# Patient Record
Sex: FEMALE | Race: WHITE | NOT HISPANIC OR LATINO | Employment: UNEMPLOYED | URBAN - METROPOLITAN AREA
[De-identification: names, ages, dates, MRNs, and addresses within clinical notes are randomized per-mention and may not be internally consistent; named-entity substitution may affect disease eponyms.]

---

## 2017-01-05 ENCOUNTER — GENERIC CONVERSION - ENCOUNTER (OUTPATIENT)
Dept: OTHER | Facility: OTHER | Age: 52
End: 2017-01-05

## 2017-01-10 ENCOUNTER — GENERIC CONVERSION - ENCOUNTER (OUTPATIENT)
Dept: OTHER | Facility: OTHER | Age: 52
End: 2017-01-10

## 2017-01-26 ENCOUNTER — GENERIC CONVERSION - ENCOUNTER (OUTPATIENT)
Dept: OTHER | Facility: OTHER | Age: 52
End: 2017-01-26

## 2017-02-23 ENCOUNTER — GENERIC CONVERSION - ENCOUNTER (OUTPATIENT)
Dept: OTHER | Facility: OTHER | Age: 52
End: 2017-02-23

## 2017-04-07 ENCOUNTER — GENERIC CONVERSION - ENCOUNTER (OUTPATIENT)
Dept: OTHER | Facility: OTHER | Age: 52
End: 2017-04-07

## 2017-05-24 ENCOUNTER — ALLSCRIPTS OFFICE VISIT (OUTPATIENT)
Dept: OTHER | Facility: OTHER | Age: 52
End: 2017-05-24

## 2017-06-12 ENCOUNTER — HOSPITAL ENCOUNTER (EMERGENCY)
Facility: HOSPITAL | Age: 52
Discharge: HOME | End: 2017-06-13
Attending: EMERGENCY MEDICINE
Payer: COMMERCIAL

## 2017-06-12 ENCOUNTER — APPOINTMENT (EMERGENCY)
Dept: RADIOLOGY | Facility: HOSPITAL | Age: 52
End: 2017-06-12
Payer: COMMERCIAL

## 2017-06-12 DIAGNOSIS — J20.9 BRONCHITIS, ACUTE: Primary | ICD-10-CM

## 2017-06-12 LAB
ANION GAP SERPL CALCULATED.3IONS-SCNC: 11 MMOL/L (ref 4–13)
BASOPHILS # BLD AUTO: 0.07 THOUSANDS/ΜL (ref 0–0.1)
BASOPHILS NFR BLD AUTO: 1 % (ref 0–1)
BUN SERPL-MCNC: 12 MG/DL (ref 5–25)
CALCIUM SERPL-MCNC: 9.3 MG/DL (ref 8.3–10.1)
CHLORIDE SERPL-SCNC: 103 MMOL/L (ref 100–108)
CO2 SERPL-SCNC: 26 MMOL/L (ref 21–32)
CREAT SERPL-MCNC: 1.02 MG/DL (ref 0.6–1.3)
DEPRECATED D DIMER PPP: 625 NG/ML (FEU) (ref 0–424)
EOSINOPHIL # BLD AUTO: 0.19 THOUSAND/ΜL (ref 0–0.61)
EOSINOPHIL NFR BLD AUTO: 2 % (ref 0–6)
ERYTHROCYTE [DISTWIDTH] IN BLOOD BY AUTOMATED COUNT: 12.7 % (ref 11.6–15.1)
GFR SERPL CREATININE-BSD FRML MDRD: 57.1 ML/MIN/1.73SQ M
GLUCOSE SERPL-MCNC: 76 MG/DL (ref 65–140)
HCT VFR BLD AUTO: 39.7 % (ref 34.8–46.1)
HGB BLD-MCNC: 12.9 G/DL (ref 11.5–15.4)
LYMPHOCYTES # BLD AUTO: 2.2 THOUSANDS/ΜL (ref 0.6–4.47)
LYMPHOCYTES NFR BLD AUTO: 18 % (ref 14–44)
MCH RBC QN AUTO: 28.6 PG (ref 26.8–34.3)
MCHC RBC AUTO-ENTMCNC: 32.5 G/DL (ref 31.4–37.4)
MCV RBC AUTO: 88 FL (ref 82–98)
MONOCYTES # BLD AUTO: 0.92 THOUSAND/ΜL (ref 0.17–1.22)
MONOCYTES NFR BLD AUTO: 8 % (ref 4–12)
NEUTROPHILS # BLD AUTO: 8.62 THOUSANDS/ΜL (ref 1.85–7.62)
NEUTS SEG NFR BLD AUTO: 71 % (ref 43–75)
PLATELET # BLD AUTO: 314 THOUSANDS/UL (ref 149–390)
PMV BLD AUTO: 9.1 FL (ref 8.9–12.7)
POTASSIUM SERPL-SCNC: 3.7 MMOL/L (ref 3.5–5.3)
RBC # BLD AUTO: 4.51 MILLION/UL (ref 3.81–5.12)
SODIUM SERPL-SCNC: 140 MMOL/L (ref 136–145)
SPECIMEN SOURCE: NORMAL
TROPONIN I BLD-MCNC: 0 NG/ML (ref 0–0.08)
WBC # BLD AUTO: 12 THOUSAND/UL (ref 4.31–10.16)

## 2017-06-12 PROCEDURE — 36415 COLL VENOUS BLD VENIPUNCTURE: CPT | Performed by: EMERGENCY MEDICINE

## 2017-06-12 PROCEDURE — 71020 HB CHEST X-RAY 2VW FRONTAL&LATL: CPT

## 2017-06-12 PROCEDURE — 93005 ELECTROCARDIOGRAM TRACING: CPT | Performed by: EMERGENCY MEDICINE

## 2017-06-12 PROCEDURE — 84484 ASSAY OF TROPONIN QUANT: CPT

## 2017-06-12 PROCEDURE — 80048 BASIC METABOLIC PNL TOTAL CA: CPT | Performed by: EMERGENCY MEDICINE

## 2017-06-12 PROCEDURE — 85379 FIBRIN DEGRADATION QUANT: CPT | Performed by: EMERGENCY MEDICINE

## 2017-06-12 PROCEDURE — 85025 COMPLETE CBC W/AUTO DIFF WBC: CPT | Performed by: EMERGENCY MEDICINE

## 2017-06-12 RX ADMIN — ALBUTEROL SULFATE 5 MG: 2.5 SOLUTION RESPIRATORY (INHALATION) at 22:37

## 2017-06-12 RX ADMIN — IPRATROPIUM BROMIDE 0.5 MG: 0.5 SOLUTION RESPIRATORY (INHALATION) at 22:38

## 2017-06-13 ENCOUNTER — APPOINTMENT (EMERGENCY)
Dept: CT IMAGING | Facility: HOSPITAL | Age: 52
End: 2017-06-13
Payer: COMMERCIAL

## 2017-06-13 VITALS
HEART RATE: 76 BPM | RESPIRATION RATE: 18 BRPM | WEIGHT: 180 LBS | OXYGEN SATURATION: 95 % | BODY MASS INDEX: 28.93 KG/M2 | TEMPERATURE: 99 F | SYSTOLIC BLOOD PRESSURE: 114 MMHG | DIASTOLIC BLOOD PRESSURE: 56 MMHG | HEIGHT: 66 IN

## 2017-06-13 PROCEDURE — 99285 EMERGENCY DEPT VISIT HI MDM: CPT

## 2017-06-13 PROCEDURE — 71275 CT ANGIOGRAPHY CHEST: CPT

## 2017-06-13 PROCEDURE — 94640 AIRWAY INHALATION TREATMENT: CPT

## 2017-06-13 RX ORDER — ALBUTEROL SULFATE 90 UG/1
2 AEROSOL, METERED RESPIRATORY (INHALATION) EVERY 4 HOURS PRN
Qty: 1 INHALER | Refills: 0 | Status: SHIPPED | OUTPATIENT
Start: 2017-06-13 | End: 2019-05-28 | Stop reason: ALTCHOICE

## 2017-06-13 RX ORDER — AZITHROMYCIN 250 MG/1
250 TABLET, FILM COATED ORAL DAILY
Qty: 6 TABLET | Refills: 0 | Status: SHIPPED | OUTPATIENT
Start: 2017-06-13 | End: 2017-06-19

## 2017-06-13 RX ORDER — PROMETHAZINE HYDROCHLORIDE AND CODEINE PHOSPHATE 6.25; 1 MG/5ML; MG/5ML
SYRUP ORAL
Qty: 120 ML | Refills: 0 | Status: SHIPPED | OUTPATIENT
Start: 2017-06-13 | End: 2017-12-28 | Stop reason: ALTCHOICE

## 2017-06-13 RX ADMIN — IOHEXOL 85 ML: 350 INJECTION, SOLUTION INTRAVENOUS at 00:36

## 2017-06-14 LAB
ATRIAL RATE: 75 BPM
P AXIS: 63 DEGREES
PR INTERVAL: 170 MS
QRS AXIS: 4 DEGREES
QRSD INTERVAL: 96 MS
QT INTERVAL: 378 MS
QTC INTERVAL: 422 MS
T WAVE AXIS: 71 DEGREES
VENTRICULAR RATE: 75 BPM

## 2017-07-05 ENCOUNTER — GENERIC CONVERSION - ENCOUNTER (OUTPATIENT)
Dept: OTHER | Facility: OTHER | Age: 52
End: 2017-07-05

## 2017-07-05 ENCOUNTER — HOSPITAL ENCOUNTER (OUTPATIENT)
Dept: RADIOLOGY | Facility: HOSPITAL | Age: 52
Discharge: HOME | End: 2017-07-05
Attending: INTERNAL MEDICINE
Payer: COMMERCIAL

## 2017-07-05 ENCOUNTER — TRANSCRIBE ORDERS (OUTPATIENT)
Dept: ADMINISTRATIVE | Facility: HOSPITAL | Age: 52
End: 2017-07-05

## 2017-07-05 DIAGNOSIS — J45.40 MODERATE PERSISTENT ASTHMA WITHOUT COMPLICATION: ICD-10-CM

## 2017-07-05 DIAGNOSIS — T17.908A ASPIRATION OF FOREIGN BODY, INITIAL ENCOUNTER: ICD-10-CM

## 2017-07-05 DIAGNOSIS — J45.40 MODERATE PERSISTENT ASTHMA WITHOUT COMPLICATION: Primary | ICD-10-CM

## 2017-07-05 PROCEDURE — 71020 HB CHEST X-RAY 2VW FRONTAL&LATL: CPT

## 2017-07-10 ENCOUNTER — TRANSCRIBE ORDERS (OUTPATIENT)
Dept: ADMINISTRATIVE | Facility: HOSPITAL | Age: 52
End: 2017-07-10

## 2017-07-10 ENCOUNTER — HOSPITAL ENCOUNTER (OUTPATIENT)
Dept: RADIOLOGY | Facility: HOSPITAL | Age: 52
Discharge: HOME | End: 2017-07-10
Attending: INTERNAL MEDICINE
Payer: COMMERCIAL

## 2017-07-10 DIAGNOSIS — R05.9 COUGH: Primary | ICD-10-CM

## 2017-07-10 DIAGNOSIS — R05.9 COUGH: ICD-10-CM

## 2017-07-10 PROCEDURE — 71020 HB CHEST X-RAY 2VW FRONTAL&LATL: CPT

## 2017-07-12 ENCOUNTER — GENERIC CONVERSION - ENCOUNTER (OUTPATIENT)
Dept: OTHER | Facility: OTHER | Age: 52
End: 2017-07-12

## 2017-11-17 DIAGNOSIS — Z12.31 ENCOUNTER FOR SCREENING MAMMOGRAM FOR MALIGNANT NEOPLASM OF BREAST: ICD-10-CM

## 2017-12-21 ENCOUNTER — GENERIC CONVERSION - ENCOUNTER (OUTPATIENT)
Dept: OTHER | Facility: OTHER | Age: 52
End: 2017-12-21

## 2017-12-27 ENCOUNTER — HOSPITAL ENCOUNTER (INPATIENT)
Facility: HOSPITAL | Age: 52
LOS: 1 days | Discharge: HOME | DRG: 897 | End: 2017-12-28
Attending: INTERNAL MEDICINE | Admitting: INTERNAL MEDICINE
Payer: COMMERCIAL

## 2017-12-27 ENCOUNTER — APPOINTMENT (EMERGENCY)
Dept: RADIOLOGY | Facility: HOSPITAL | Age: 52
DRG: 897 | End: 2017-12-27
Payer: COMMERCIAL

## 2017-12-27 DIAGNOSIS — T50.901A DRUG OVERDOSE: ICD-10-CM

## 2017-12-27 DIAGNOSIS — F10.929 ALCOHOL INTOXICATION (HCC): Primary | ICD-10-CM

## 2017-12-27 LAB
ALBUMIN SERPL BCP-MCNC: 3.2 G/DL (ref 3.5–5)
ALP SERPL-CCNC: 102 U/L (ref 46–116)
ALT SERPL W P-5'-P-CCNC: 31 U/L (ref 12–78)
AMPHETAMINES SERPL QL SCN: NEGATIVE
ANION GAP SERPL CALCULATED.3IONS-SCNC: 9 MMOL/L (ref 4–13)
APAP SERPL-MCNC: <2 UG/ML (ref 10–30)
APTT PPP: 29 SECONDS (ref 23–35)
AST SERPL W P-5'-P-CCNC: 18 U/L (ref 5–45)
ATRIAL RATE: 78 BPM
BARBITURATES UR QL: NEGATIVE
BASOPHILS # BLD AUTO: 0.06 THOUSANDS/ΜL (ref 0–0.1)
BASOPHILS NFR BLD AUTO: 1 % (ref 0–1)
BENZODIAZ UR QL: POSITIVE
BILIRUB SERPL-MCNC: 0.1 MG/DL (ref 0.2–1)
BUN SERPL-MCNC: 13 MG/DL (ref 5–25)
CALCIUM SERPL-MCNC: 7.9 MG/DL (ref 8.3–10.1)
CHLORIDE SERPL-SCNC: 110 MMOL/L (ref 100–108)
CLARITY, POC: CLEAR
CO2 SERPL-SCNC: 27 MMOL/L (ref 21–32)
COCAINE UR QL: NEGATIVE
COLOR, POC: YELLOW
CREAT SERPL-MCNC: 0.99 MG/DL (ref 0.6–1.3)
EOSINOPHIL # BLD AUTO: 0.34 THOUSAND/ΜL (ref 0–0.61)
EOSINOPHIL NFR BLD AUTO: 5 % (ref 0–6)
ERYTHROCYTE [DISTWIDTH] IN BLOOD BY AUTOMATED COUNT: 15.7 % (ref 11.6–15.1)
ETHANOL EXG-MCNC: 0.16 MG/DL
ETHANOL EXG-MCNC: 0.26 MG/DL
ETHANOL SERPL-MCNC: 436 MG/DL (ref 0–3)
EXT BILIRUBIN, UA: NEGATIVE
EXT BLOOD URINE: NORMAL
EXT GLUCOSE, UA: NEGATIVE
EXT KETONES: NEGATIVE
EXT NITRITE, UA: NEGATIVE
EXT PH, UA: 5
EXT PROTEIN, UA: NEGATIVE
EXT SPECIFIC GRAVITY, UA: 1.01
EXT UROBILINOGEN: 0.2
GFR SERPL CREATININE-BSD FRML MDRD: 66 ML/MIN/1.73SQ M
GLUCOSE SERPL-MCNC: 114 MG/DL (ref 65–140)
HCT VFR BLD AUTO: 35.9 % (ref 34.8–46.1)
HGB BLD-MCNC: 11.4 G/DL (ref 11.5–15.4)
INR PPP: 0.93 (ref 0.86–1.16)
LYMPHOCYTES # BLD AUTO: 3.11 THOUSANDS/ΜL (ref 0.6–4.47)
LYMPHOCYTES NFR BLD AUTO: 45 % (ref 14–44)
MCH RBC QN AUTO: 28.1 PG (ref 26.8–34.3)
MCHC RBC AUTO-ENTMCNC: 31.8 G/DL (ref 31.4–37.4)
MCV RBC AUTO: 88 FL (ref 82–98)
METHADONE UR QL: NEGATIVE
MONOCYTES # BLD AUTO: 0.38 THOUSAND/ΜL (ref 0.17–1.22)
MONOCYTES NFR BLD AUTO: 6 % (ref 4–12)
NEUTROPHILS # BLD AUTO: 2.96 THOUSANDS/ΜL (ref 1.85–7.62)
NEUTS SEG NFR BLD AUTO: 43 % (ref 43–75)
OPIATES UR QL SCN: NEGATIVE
P AXIS: 75 DEGREES
PCP UR QL: NEGATIVE
PLATELET # BLD AUTO: 300 THOUSANDS/UL (ref 149–390)
PMV BLD AUTO: 8.1 FL (ref 8.9–12.7)
POTASSIUM SERPL-SCNC: 4.2 MMOL/L (ref 3.5–5.3)
PR INTERVAL: 162 MS
PROT SERPL-MCNC: 6.5 G/DL (ref 6.4–8.2)
PROTHROMBIN TIME: 12.3 SECONDS (ref 12.1–14.4)
QRS AXIS: 63 DEGREES
QRSD INTERVAL: 86 MS
QT INTERVAL: 380 MS
QTC INTERVAL: 433 MS
RBC # BLD AUTO: 4.06 MILLION/UL (ref 3.81–5.12)
SALICYLATES SERPL-MCNC: <3 MG/DL (ref 3–20)
SODIUM SERPL-SCNC: 146 MMOL/L (ref 136–145)
T WAVE AXIS: 64 DEGREES
THC UR QL: NEGATIVE
TROPONIN I SERPL-MCNC: <0.02 NG/ML
VENTRICULAR RATE: 78 BPM
WBC # BLD AUTO: 6.85 THOUSAND/UL (ref 4.31–10.16)
WBC # BLD EST: NEGATIVE 10*3/UL

## 2017-12-27 PROCEDURE — 80320 DRUG SCREEN QUANTALCOHOLS: CPT | Performed by: PHYSICIAN ASSISTANT

## 2017-12-27 PROCEDURE — 36415 COLL VENOUS BLD VENIPUNCTURE: CPT | Performed by: PHYSICIAN ASSISTANT

## 2017-12-27 PROCEDURE — 85025 COMPLETE CBC W/AUTO DIFF WBC: CPT | Performed by: PHYSICIAN ASSISTANT

## 2017-12-27 PROCEDURE — 71020 HB CHEST X-RAY 2VW FRONTAL&LATL: CPT

## 2017-12-27 PROCEDURE — 85610 PROTHROMBIN TIME: CPT | Performed by: PHYSICIAN ASSISTANT

## 2017-12-27 PROCEDURE — 82075 ASSAY OF BREATH ETHANOL: CPT | Performed by: PHYSICIAN ASSISTANT

## 2017-12-27 PROCEDURE — 80053 COMPREHEN METABOLIC PANEL: CPT | Performed by: PHYSICIAN ASSISTANT

## 2017-12-27 PROCEDURE — 96374 THER/PROPH/DIAG INJ IV PUSH: CPT

## 2017-12-27 PROCEDURE — 93005 ELECTROCARDIOGRAM TRACING: CPT

## 2017-12-27 PROCEDURE — 80329 ANALGESICS NON-OPIOID 1 OR 2: CPT | Performed by: PHYSICIAN ASSISTANT

## 2017-12-27 PROCEDURE — 93005 ELECTROCARDIOGRAM TRACING: CPT | Performed by: PHYSICIAN ASSISTANT

## 2017-12-27 PROCEDURE — 80307 DRUG TEST PRSMV CHEM ANLYZR: CPT | Performed by: PHYSICIAN ASSISTANT

## 2017-12-27 PROCEDURE — 81002 URINALYSIS NONAUTO W/O SCOPE: CPT | Performed by: PHYSICIAN ASSISTANT

## 2017-12-27 PROCEDURE — 96361 HYDRATE IV INFUSION ADD-ON: CPT

## 2017-12-27 PROCEDURE — 85730 THROMBOPLASTIN TIME PARTIAL: CPT | Performed by: PHYSICIAN ASSISTANT

## 2017-12-27 PROCEDURE — 84484 ASSAY OF TROPONIN QUANT: CPT | Performed by: PHYSICIAN ASSISTANT

## 2017-12-27 RX ORDER — MELATON/B.COHOSH/VALERIAN/HOPS 3 MG-40 MG
1 CAPSULE ORAL DAILY
COMMUNITY

## 2017-12-27 RX ORDER — CETIRIZINE HYDROCHLORIDE 10 MG/1
10 TABLET ORAL DAILY
COMMUNITY
End: 2018-05-01 | Stop reason: ALTCHOICE

## 2017-12-27 RX ORDER — DIPHENOXYLATE HYDROCHLORIDE AND ATROPINE SULFATE 2.5; .025 MG/1; MG/1
1 TABLET ORAL DAILY
COMMUNITY

## 2017-12-27 RX ORDER — PHENTERMINE HYDROCHLORIDE 30 MG/1
30 CAPSULE ORAL EVERY MORNING
COMMUNITY
End: 2018-02-26 | Stop reason: SDUPTHER

## 2017-12-27 RX ORDER — ACETAMINOPHEN 325 MG/1
650 TABLET ORAL ONCE
Status: COMPLETED | OUTPATIENT
Start: 2017-12-27 | End: 2017-12-27

## 2017-12-27 RX ORDER — SODIUM CHLORIDE 9 MG/ML
125 INJECTION, SOLUTION INTRAVENOUS CONTINUOUS
Status: DISCONTINUED | OUTPATIENT
Start: 2017-12-27 | End: 2017-12-28 | Stop reason: HOSPADM

## 2017-12-27 RX ORDER — ONDANSETRON 2 MG/ML
INJECTION INTRAMUSCULAR; INTRAVENOUS
Status: COMPLETED
Start: 2017-12-27 | End: 2017-12-27

## 2017-12-27 RX ORDER — VENLAFAXINE 75 MG/1
150 TABLET ORAL DAILY
COMMUNITY
End: 2018-02-26 | Stop reason: SDUPTHER

## 2017-12-27 RX ORDER — TRAMADOL HYDROCHLORIDE 50 MG/1
50 TABLET ORAL EVERY 6 HOURS PRN
COMMUNITY
End: 2018-02-26 | Stop reason: SDUPTHER

## 2017-12-27 RX ORDER — ONDANSETRON 2 MG/ML
4 INJECTION INTRAMUSCULAR; INTRAVENOUS ONCE
Status: COMPLETED | OUTPATIENT
Start: 2017-12-27 | End: 2017-12-27

## 2017-12-27 RX ORDER — ALPRAZOLAM 0.5 MG/1
0.5 TABLET ORAL 2 TIMES DAILY
COMMUNITY
End: 2018-02-26 | Stop reason: SDUPTHER

## 2017-12-27 RX ADMIN — ACETAMINOPHEN 650 MG: 325 TABLET, FILM COATED ORAL at 20:24

## 2017-12-27 RX ADMIN — SODIUM CHLORIDE 125 ML/HR: 0.9 INJECTION, SOLUTION INTRAVENOUS at 15:37

## 2017-12-27 RX ADMIN — ONDANSETRON 4 MG: 2 INJECTION INTRAMUSCULAR; INTRAVENOUS at 13:15

## 2017-12-27 RX ADMIN — SODIUM CHLORIDE 1000 ML: 0.9 INJECTION, SOLUTION INTRAVENOUS at 13:30

## 2017-12-27 NOTE — ED PROVIDER NOTES
History  Chief Complaint   Patient presents with    Alcohol Intoxication     To ED via EMS for evaluation of alcohol intoxication and suicidal ideation  Pt states that she "hates her life"  Vomited on arrival Patient incomprehensable, speech garbled, rambling  Patient brought in by ambulance for alcohol intoxication and SI  She denies overdose on medication  She states she drank too much alcohol and does not like her life  Patient unable to give a reliable history due to alcohol intoxication  She does have multiple ecchymotic areas on B/L arms, but denies any recent falls  Her son did come into the room and states patient's daughter called the ambulance  She states she was not acting right and was crying and just kneeled on the ground  Son states patient has a history of taking too many pain meds and drinking alcohol  He is unsure if she drinks everyday because she likes to hide it per son  History provided by:  EMS personnel, patient and relative  History limited by:  Acuity of condition  Alcohol Intoxication   Similar prior episodes: no    Severity:  Moderate  Timing:  Constant  Chronicity:  New  Suspected agents:  Alcohol  Associated symptoms: confusion, nausea, suicidal ideation and vomiting    Associated symptoms: no abdominal pain, no bladder incontinence, no bowel incontinence, no hallucinations and no shortness of breath        Prior to Admission Medications   Prescriptions Last Dose Informant Patient Reported? Taking?    ALPRAZolam (XANAX) 0 5 mg tablet  Self Yes Yes   Sig: Take 0 5 mg by mouth 2 (two) times a day   Black Cohosh-SoyIsoflav-Magnol (ESTROVEN MENOPAUSE RELIEF) CAPS  Self Yes Yes   Sig: Take 1 capsule by mouth daily   cetirizine (ZyrTEC) 10 mg tablet  Self Yes Yes   Sig: Take 10 mg by mouth daily   multivitamin (THERAGRAN) TABS  Self Yes Yes   Sig: Take 1 tablet by mouth daily   phentermine 30 MG capsule  Self Yes Yes   Sig: Take 30 mg by mouth every morning   traMADol (ULTRAM) 50 mg tablet  Self Yes Yes   Sig: Take 50 mg by mouth every 6 (six) hours as needed for moderate pain   venlafaxine (EFFEXOR) 75 mg tablet  Self Yes Yes   Sig: Take 150 mg by mouth daily      Facility-Administered Medications: None       Past Medical History:   Diagnosis Date    Anxiety     Asthma     Chronic pain     Psychiatric disorder        History reviewed  No pertinent surgical history  History reviewed  No pertinent family history  I have reviewed and agree with the history as documented  Social History   Substance Use Topics    Smoking status: Never Smoker    Smokeless tobacco: Never Used    Alcohol use Yes        Review of Systems   Unable to perform ROS: Mental status change   Constitutional: Negative for chills and fever  Respiratory: Negative for shortness of breath  Gastrointestinal: Positive for nausea and vomiting  Negative for abdominal pain and bowel incontinence  Genitourinary: Negative for bladder incontinence  Skin: Positive for color change  Psychiatric/Behavioral: Positive for confusion and suicidal ideas  Negative for hallucinations  Physical Exam  ED Triage Vitals [12/27/17 1325]   Temperature Pulse Respirations Blood Pressure SpO2   98 1 °F (36 7 °C) 85 20 117/58 99 %      Temp Source Heart Rate Source Patient Position - Orthostatic VS BP Location FiO2 (%)   Temporal Monitor Lying Right arm --      Pain Score       No Pain           Orthostatic Vital Signs  Vitals:    12/27/17 1845 12/27/17 1900 12/27/17 1915 12/27/17 2015   BP: 113/57 102/57     Pulse: 98 103 102 93   Patient Position - Orthostatic VS: Lying          Physical Exam   Constitutional: She appears well-developed and well-nourished  She appears lethargic  She appears distressed  Patient drowsy and vomiting upon arrival     HENT:   Head: Normocephalic and atraumatic  Nose: Nose normal    Eyes: Right conjunctiva is injected  Left conjunctiva is injected  Right pupil is round   Left pupil is round  Neck: Normal range of motion  Cardiovascular: Normal rate, regular rhythm and normal heart sounds  No murmur heard  Pulmonary/Chest: Effort normal  She has wheezes (b/l bases)  Abdominal: Soft  Normal appearance and bowel sounds are normal  There is no tenderness  Obese abdomen  Musculoskeletal: Normal range of motion  She exhibits no edema  Neurological: She has normal strength  She appears lethargic  She is disoriented (to place and time)  No cranial nerve deficit  GCS eye subscore is 3  GCS verbal subscore is 4  GCS motor subscore is 6  Skin: Skin is warm and dry  Bruising (b/l forearms  ) noted  No rash noted  Psychiatric: Her speech is slurred  She exhibits a depressed mood  She expresses suicidal ideation  She expresses no homicidal ideation  Nursing note and vitals reviewed        ED Medications  Medications   sodium chloride 0 9 % infusion (125 mL/hr Intravenous New Bag 12/27/17 1537)   ondansetron (ZOFRAN) injection 4 mg (4 mg Intravenous Given 12/27/17 1315)   sodium chloride 0 9 % bolus 1,000 mL (0 mL Intravenous Stopped 12/27/17 1500)   acetaminophen (TYLENOL) tablet 650 mg (650 mg Oral Given 12/27/17 2024)       Diagnostic Studies  Results Reviewed     Procedure Component Value Units Date/Time    POCT alcohol breath test [[id number]]  (Normal) Resulted:  12/27/17 2150    Lab Status:  Final result Updated:  12/27/17 2150     EXTBreath Alcohol 0 158    POCT alcohol breath test [[id number]]  (Normal) Resulted:  12/27/17 1859    Lab Status:  Final result Updated:  12/27/17 1859     EXTBreath Alcohol 0 255    Rapid drug screen, urine [[id number]]  (Abnormal) Collected:  12/27/17 1526    Lab Status:  Final result Specimen:  Urine from Urine, Clean Catch Updated:  12/27/17 1608     Amph/Meth UR Negative     Barbiturate Ur Negative     Benzodiazepine Urine Positive (A)     Cocaine Urine Negative     Methadone Urine Negative     Opiate Urine Negative     PCP Ur Negative     THC Urine Negative    Narrative:         Presumptive report  If requested, specimen will be sent to reference lab for confirmation  FOR MEDICAL PURPOSES ONLY  IF CONFIRMATION NEEDED PLEASE CONTACT THE LAB WITHIN 5 DAYS      Drug Screen Cutoff Levels:  AMPHETAMINE/METHAMPHETAMINES  1000 ng/mL  BARBITURATES     200 ng/mL  BENZODIAZEPINES     200 ng/mL  COCAINE      300 ng/mL  METHADONE      300 ng/mL  OPIATES      300 ng/mL  PHENCYCLIDINE     25 ng/mL  THC       50 ng/mL    POCT urinalysis dipstick [[id number]]  (Normal) Resulted:  12/27/17 1531    Lab Status:  Final result Specimen:  Urine Updated:  12/27/17 1533     Color, UA yellow     Clarity, UA clear     EXT Glucose, UA negative     EXT Bilirubin, UA (Ref: Negative) negative     EXT Ketones, UA (Ref: Negative) negative     EXT Spec Grav, UA 1 010     EXT Blood, UA (Ref: Negative) trace     EXT pH, UA 5 0     EXT Protein, UA (Ref: Negative) negative     EXT Urobilinogen, UA (Ref: 0 2- 1 0) 0 2     EXT Leukocytes, UA (Ref: Negative) negative     EXT Nitrite, UA (Ref: Negative) negative    Acetaminophen level [[id number]]  (Abnormal) Collected:  12/27/17 1334    Lab Status:  Final result Specimen:  Blood from Arm, Right Updated:  12/27/17 1431     Acetaminophen Level <2 (L) ug/mL     Comprehensive metabolic panel [[id number]]  (Abnormal) Collected:  12/27/17 1334    Lab Status:  Final result Specimen:  Blood from Arm, Right Updated:  12/27/17 1424     Sodium 146 (H) mmol/L      Potassium 4 2 mmol/L      Chloride 110 (H) mmol/L      CO2 27 mmol/L      Anion Gap 9 mmol/L      BUN 13 mg/dL      Creatinine 0 99 mg/dL      Glucose 114 mg/dL      Calcium 7 9 (L) mg/dL      AST 18 U/L      ALT 31 U/L      Alkaline Phosphatase 102 U/L      Total Protein 6 5 g/dL      Albumin 3 2 (L) g/dL      Total Bilirubin 0 10 (L) mg/dL      eGFR 66 ml/min/1 73sq m     Narrative:         National Kidney Disease Education Program recommendations are as follows:  GFR calculation is accurate only with a steady state creatinine  Chronic Kidney disease less than 60 ml/min/1 73 sq  meters  Kidney failure less than 15 ml/min/1 73 sq  meters  Salicylate level [[id number]]  (Abnormal) Collected:  12/27/17 1334    Lab Status:  Final result Specimen:  Blood from Arm, Right Updated:  54/88/54 0622     Salicylate Lvl <3 (L) mg/dL     Ethanol [[id number]]  (Abnormal) Collected:  12/27/17 1334    Lab Status:  Final result Specimen:  Blood from Arm, Right Updated:  12/27/17 1410     Ethanol Lvl 436 (H) mg/dL     Troponin I [[id number]]  (Normal) Collected:  12/27/17 1333    Lab Status:  Final result Specimen:  Blood from Arm, Right Updated:  12/27/17 1409     Troponin I <0 02 ng/mL     Narrative:         [employer] Chemistry analyzer 99% cutoff is > 0 04 ng/mL in network labs    o cTnI 99% cutoff is useful only when applied to patients in the clinical setting of myocardial ischemia  o cTnI 99% cutoff should be interpreted in the context of clinical history, ECG findings and possibly cardiac imaging to establish correct diagnosis  o cTnI 99% cutoff may be suggestive but clearly not indicative of a coronary event without the clinical setting of myocardial ischemia  Protime-INR [[id number]]  (Normal) Collected:  12/27/17 1334    Lab Status:  Final result Specimen:  Blood from Arm, Right Updated:  12/27/17 1357     Protime 12 3 seconds      INR 0 93    APTT [[id number]]  (Normal) Collected:  12/27/17 1334    Lab Status:  Final result Specimen:  Blood from Arm, Right Updated:  12/27/17 1357     PTT 29 seconds     Narrative:          Therapeutic Heparin Range = 60-90 seconds    CBC and differential [[id number]]  (Abnormal) Collected:  12/27/17 1334    Lab Status:  Final result Specimen:  Blood from Arm, Right Updated:  12/27/17 1346     WBC 6 85 Thousand/uL      RBC 4 06 Million/uL      Hemoglobin 11 4 (L) g/dL      Hematocrit 35 9 %      MCV 88 fL      MCH 28 1 pg      MCHC 31 8 g/dL      RDW 15 7 (H) %      MPV 8 1 (L) fL      Platelets 044 Thousands/uL      Neutrophils Relative 43 %      Lymphocytes Relative 45 (H) %      Monocytes Relative 6 %      Eosinophils Relative 5 %      Basophils Relative 1 %      Neutrophils Absolute 2 96 Thousands/µL      Lymphocytes Absolute 3 11 Thousands/µL      Monocytes Absolute 0 38 Thousand/µL      Eosinophils Absolute 0 34 Thousand/µL      Basophils Absolute 0 06 Thousands/µL                  XR chest 2 views   ED Interpretation by Glenny Vital PA-C (12/27 3853)   No acute abnormalities  Final Result by Debria Hatchet, MD (12/27 2282)      No active pulmonary disease  Workstation performed: [id number]                    Procedures  ECG 12 Lead Documentation  Date/Time: 12/27/2017 3:05 PM  Performed by: Beryle  by: Prem Gaines     Indications / Diagnosis:  Overdose  Patient location:  ED  Previous ECG:     Previous ECG:  Unavailable  Quality:     Tracing quality:  Limited by artifact  Rate:     ECG rate:  78  Rhythm:     Rhythm: sinus rhythm    ST segments:     ST segments:  Normal  T waves:     T waves: normal               Phone Contacts  ED Phone Contact    ED Course  ED Course as of Dec 27 2210   Wed Dec 27, 2017   1634 Patient initially admitted for drug overdose and intoxication  Dr Rima Limon spoke with patient and states she only took 3 xanax and declined admission  Will monitor patient in the ED for next 12 hours  1930 Patient requesting to leave  She states she just wants to go home  She is tearful  I spoke with patient's roommate that has lived with her for 5 years  She states patient was a recovering alcoholic and started drinking again 2 years ago  She has been drinking off and on and has been abusing ultram and flexeril  Roommate did throw away her pills, but PCP recently prescribed her more  She states today she left the house at 9:30am and patient was fine, but when she came back at 11:30 patient was crying and lethargic    She states she layed her on the couch and called 911  Patient did tell her she had suicidal thoughts  Care transferred to Dr Danuta Sanchez at [id number]  Patient will need repeat BAT at midnight and consult crisis  MDM  Number of Diagnoses or Management Options  Alcohol intoxication ([facility] ): new and requires workup  Drug overdose: new and requires workup  Diagnosis management comments: Patient with unknown drug overdose and alcohol intoxication with depression, will admit for monitoring due to unknown overdose  Patient will need to see psychiatrist for depression   Amount and/or Complexity of Data Reviewed  Clinical lab tests: ordered and reviewed  Tests in the radiology section of CPT®: ordered and reviewed  Independent visualization of images, tracings, or specimens: yes    Patient Progress  Patient progress: stable    CritCare Time    Disposition  Final diagnoses:   Alcohol intoxication ([facility] )   Drug overdose     Time reflects when diagnosis was documented in both MDM as applicable and the Disposition within this note     Time User Action Codes Description Comment    12/27/2017  3:10 PM Galen Dsouza [F10 929] Alcohol intoxication ([facility] )     12/27/2017  3:10 PM Derinda Dancer Drug overdose       ED Disposition     ED Disposition Condition Comment    Admit  Case was discussed with Dr Iris Salcedo and the patient's admission status was agreed to be Admission Status: inpatient status to the service of Dr Iris Salcedo   Follow-up Information    None       Patient's Medications   Discharge Prescriptions    No medications on file     No discharge procedures on file      ED Provider  Electronically Signed by           Terell Myles PA-C  12/27/17 178 [address]  12/27/17 7486

## 2017-12-27 NOTE — ED NOTES
Patient is overheard crying in bed with family at bedside  Emotional support provided       Keena Houston RN  12/27/17 2419

## 2017-12-27 NOTE — ED NOTES
Patient provided bed pan to urinate in  Instructed patient and family to hit call bell to allow staff to remove bed pan  Patient removed bed pan herself  Staff cleaned bed and provided patient new gown and sheets       Lincoln Wilkinson RN  12/27/17 6341

## 2017-12-27 NOTE — Clinical Note
Case was discussed with Dr Marco Land and the patient's admission status was agreed to be Admission Status: inpatient status to the service of Dr Marco Land

## 2017-12-27 NOTE — ED NOTES
Patient presents to the ED observed to be disheveled appearance with altered mental status  EMS reports patient was at work intoxicated when staff called 46  Patient asked how much she drank responding, "i don't know a lot" Patient was unable to specify amt consumed       Jean Pierre Elam RN  12/27/17 6994

## 2017-12-28 ENCOUNTER — HOSPITAL ENCOUNTER (EMERGENCY)
Facility: HOSPITAL | Age: 52
End: 2017-12-29
Attending: EMERGENCY MEDICINE
Payer: COMMERCIAL

## 2017-12-28 ENCOUNTER — APPOINTMENT (EMERGENCY)
Dept: RADIOLOGY | Facility: HOSPITAL | Age: 52
End: 2017-12-28
Payer: COMMERCIAL

## 2017-12-28 ENCOUNTER — APPOINTMENT (EMERGENCY)
Dept: CT IMAGING | Facility: HOSPITAL | Age: 52
End: 2017-12-28
Payer: COMMERCIAL

## 2017-12-28 VITALS
TEMPERATURE: 98.1 F | HEART RATE: 74 BPM | DIASTOLIC BLOOD PRESSURE: 64 MMHG | WEIGHT: 250 LBS | SYSTOLIC BLOOD PRESSURE: 136 MMHG | BODY MASS INDEX: 37.03 KG/M2 | HEIGHT: 69 IN | RESPIRATION RATE: 20 BRPM | OXYGEN SATURATION: 98 %

## 2017-12-28 DIAGNOSIS — R56.9 SEIZURE (HCC): Primary | ICD-10-CM

## 2017-12-28 DIAGNOSIS — F10.929 ALCOHOL INTOXICATION (HCC): ICD-10-CM

## 2017-12-28 DIAGNOSIS — F19.10 POLYSUBSTANCE ABUSE (HCC): ICD-10-CM

## 2017-12-28 DIAGNOSIS — R45.851 SUICIDAL IDEATION: ICD-10-CM

## 2017-12-28 LAB
ALBUMIN SERPL BCP-MCNC: 4.2 G/DL (ref 3.5–5)
ALP SERPL-CCNC: 118 U/L (ref 46–116)
ALT SERPL W P-5'-P-CCNC: 33 U/L (ref 12–78)
AMPHETAMINES SERPL QL SCN: NEGATIVE
ANION GAP SERPL CALCULATED.3IONS-SCNC: 10 MMOL/L (ref 4–13)
APAP SERPL-MCNC: <2 UG/ML (ref 10–30)
AST SERPL W P-5'-P-CCNC: 28 U/L (ref 5–45)
BARBITURATES UR QL: NEGATIVE
BASOPHILS # BLD AUTO: 0.03 THOUSANDS/ΜL (ref 0–0.1)
BASOPHILS NFR BLD AUTO: 0 % (ref 0–1)
BENZODIAZ UR QL: NEGATIVE
BILIRUB SERPL-MCNC: 0.4 MG/DL (ref 0.2–1)
BUN SERPL-MCNC: 9 MG/DL (ref 5–25)
CALCIUM SERPL-MCNC: 8.9 MG/DL (ref 8.3–10.1)
CHLORIDE SERPL-SCNC: 102 MMOL/L (ref 100–108)
CO2 SERPL-SCNC: 27 MMOL/L (ref 21–32)
COCAINE UR QL: NEGATIVE
CREAT SERPL-MCNC: 0.83 MG/DL (ref 0.6–1.3)
EOSINOPHIL # BLD AUTO: 0.08 THOUSAND/ΜL (ref 0–0.61)
EOSINOPHIL NFR BLD AUTO: 1 % (ref 0–6)
ERYTHROCYTE [DISTWIDTH] IN BLOOD BY AUTOMATED COUNT: 15.2 % (ref 11.6–15.1)
ETHANOL EXG-MCNC: 0.1 MG/DL
ETHANOL EXG-MCNC: 0.11 MG/DL
ETHANOL EXG-MCNC: 0.16 MG/DL
ETHANOL SERPL-MCNC: 292 MG/DL (ref 0–3)
GFR SERPL CREATININE-BSD FRML MDRD: 82 ML/MIN/1.73SQ M
GLUCOSE SERPL-MCNC: 99 MG/DL (ref 65–140)
HCT VFR BLD AUTO: 36.5 % (ref 34.8–46.1)
HGB BLD-MCNC: 11.6 G/DL (ref 11.5–15.4)
LYMPHOCYTES # BLD AUTO: 2.37 THOUSANDS/ΜL (ref 0.6–4.47)
LYMPHOCYTES NFR BLD AUTO: 31 % (ref 14–44)
MAGNESIUM SERPL-MCNC: 2.2 MG/DL (ref 1.6–2.6)
MCH RBC QN AUTO: 27.8 PG (ref 26.8–34.3)
MCHC RBC AUTO-ENTMCNC: 31.8 G/DL (ref 31.4–37.4)
MCV RBC AUTO: 88 FL (ref 82–98)
METHADONE UR QL: NEGATIVE
MONOCYTES # BLD AUTO: 0.27 THOUSAND/ΜL (ref 0.17–1.22)
MONOCYTES NFR BLD AUTO: 4 % (ref 4–12)
NEUTROPHILS # BLD AUTO: 4.96 THOUSANDS/ΜL (ref 1.85–7.62)
NEUTS SEG NFR BLD AUTO: 64 % (ref 43–75)
OPIATES UR QL SCN: NEGATIVE
PCP UR QL: NEGATIVE
PLATELET # BLD AUTO: 279 THOUSANDS/UL (ref 149–390)
PMV BLD AUTO: 8.3 FL (ref 8.9–12.7)
POTASSIUM SERPL-SCNC: 4 MMOL/L (ref 3.5–5.3)
PROLACTIN SERPL-MCNC: 27.7 NG/ML
PROT SERPL-MCNC: 7.7 G/DL (ref 6.4–8.2)
RBC # BLD AUTO: 4.17 MILLION/UL (ref 3.81–5.12)
SALICYLATES SERPL-MCNC: <3 MG/DL (ref 3–20)
SODIUM SERPL-SCNC: 139 MMOL/L (ref 136–145)
THC UR QL: NEGATIVE
WBC # BLD AUTO: 7.71 THOUSAND/UL (ref 4.31–10.16)

## 2017-12-28 PROCEDURE — 83735 ASSAY OF MAGNESIUM: CPT | Performed by: EMERGENCY MEDICINE

## 2017-12-28 PROCEDURE — 84146 ASSAY OF PROLACTIN: CPT | Performed by: EMERGENCY MEDICINE

## 2017-12-28 PROCEDURE — 80307 DRUG TEST PRSMV CHEM ANLYZR: CPT | Performed by: EMERGENCY MEDICINE

## 2017-12-28 PROCEDURE — 80320 DRUG SCREEN QUANTALCOHOLS: CPT | Performed by: EMERGENCY MEDICINE

## 2017-12-28 PROCEDURE — 99285 EMERGENCY DEPT VISIT HI MDM: CPT

## 2017-12-28 PROCEDURE — 85025 COMPLETE CBC W/AUTO DIFF WBC: CPT | Performed by: EMERGENCY MEDICINE

## 2017-12-28 PROCEDURE — 80053 COMPREHEN METABOLIC PANEL: CPT | Performed by: EMERGENCY MEDICINE

## 2017-12-28 PROCEDURE — 71010 HB CHEST X-RAY 1 VIEW FRONTAL (PORTABLE): CPT

## 2017-12-28 PROCEDURE — 93005 ELECTROCARDIOGRAM TRACING: CPT

## 2017-12-28 PROCEDURE — 93005 ELECTROCARDIOGRAM TRACING: CPT | Performed by: EMERGENCY MEDICINE

## 2017-12-28 PROCEDURE — 96361 HYDRATE IV INFUSION ADD-ON: CPT

## 2017-12-28 PROCEDURE — 82075 ASSAY OF BREATH ETHANOL: CPT | Performed by: PHYSICIAN ASSISTANT

## 2017-12-28 PROCEDURE — 96376 TX/PRO/DX INJ SAME DRUG ADON: CPT

## 2017-12-28 PROCEDURE — 82075 ASSAY OF BREATH ETHANOL: CPT | Performed by: EMERGENCY MEDICINE

## 2017-12-28 PROCEDURE — 80329 ANALGESICS NON-OPIOID 1 OR 2: CPT | Performed by: EMERGENCY MEDICINE

## 2017-12-28 PROCEDURE — 36415 COLL VENOUS BLD VENIPUNCTURE: CPT | Performed by: EMERGENCY MEDICINE

## 2017-12-28 PROCEDURE — 96375 TX/PRO/DX INJ NEW DRUG ADDON: CPT

## 2017-12-28 PROCEDURE — 70450 CT HEAD/BRAIN W/O DYE: CPT

## 2017-12-28 PROCEDURE — 96365 THER/PROPH/DIAG IV INF INIT: CPT

## 2017-12-28 RX ORDER — ONDANSETRON 2 MG/ML
4 INJECTION INTRAMUSCULAR; INTRAVENOUS ONCE
Status: COMPLETED | OUTPATIENT
Start: 2017-12-28 | End: 2017-12-28

## 2017-12-28 RX ORDER — BUDESONIDE AND FORMOTEROL FUMARATE DIHYDRATE 160; 4.5 UG/1; UG/1
AEROSOL RESPIRATORY (INHALATION)
COMMUNITY
Start: 2017-12-21 | End: 2019-05-28 | Stop reason: ALTCHOICE

## 2017-12-28 RX ORDER — PHENTERMINE HYDROCHLORIDE 37.5 MG/1
1 CAPSULE ORAL DAILY
COMMUNITY
Start: 2017-05-24 | End: 2017-12-28

## 2017-12-28 RX ORDER — MENTHOL 5.8 MG/1
1 LOZENGE ORAL DAILY
COMMUNITY
Start: 2013-01-28 | End: 2018-05-01 | Stop reason: SDUPTHER

## 2017-12-28 RX ORDER — VENLAFAXINE HYDROCHLORIDE 150 MG/1
CAPSULE, EXTENDED RELEASE ORAL
COMMUNITY
Start: 2016-05-11 | End: 2018-02-26 | Stop reason: SDUPTHER

## 2017-12-28 RX ORDER — ACETAMINOPHEN 325 MG/1
975 TABLET ORAL ONCE
Status: COMPLETED | OUTPATIENT
Start: 2017-12-28 | End: 2017-12-28

## 2017-12-28 RX ORDER — KETOROLAC TROMETHAMINE 30 MG/ML
30 INJECTION, SOLUTION INTRAMUSCULAR; INTRAVENOUS ONCE
Status: COMPLETED | OUTPATIENT
Start: 2017-12-28 | End: 2017-12-28

## 2017-12-28 RX ORDER — TRAMADOL HYDROCHLORIDE 50 MG/1
TABLET ORAL
COMMUNITY
Start: 2016-01-26 | End: 2018-02-26 | Stop reason: SDUPTHER

## 2017-12-28 RX ORDER — SODIUM CHLORIDE 9 MG/ML
125 INJECTION, SOLUTION INTRAVENOUS CONTINUOUS
Status: DISCONTINUED | OUTPATIENT
Start: 2017-12-28 | End: 2017-12-29 | Stop reason: HOSPADM

## 2017-12-28 RX ORDER — ALPRAZOLAM 0.5 MG/1
1 TABLET ORAL 2 TIMES DAILY PRN
COMMUNITY
Start: 2015-09-03 | End: 2018-08-21 | Stop reason: SDUPTHER

## 2017-12-28 RX ADMIN — ONDANSETRON 4 MG: 2 INJECTION INTRAMUSCULAR; INTRAVENOUS at 13:03

## 2017-12-28 RX ADMIN — ACETAMINOPHEN 975 MG: 325 TABLET, FILM COATED ORAL at 16:46

## 2017-12-28 RX ADMIN — THIAMINE HYDROCHLORIDE 100 MG: 100 INJECTION, SOLUTION INTRAMUSCULAR; INTRAVENOUS at 16:49

## 2017-12-28 RX ADMIN — KETOROLAC TROMETHAMINE 30 MG: 30 INJECTION, SOLUTION INTRAMUSCULAR at 17:50

## 2017-12-28 RX ADMIN — ONDANSETRON 4 MG: 2 INJECTION INTRAMUSCULAR; INTRAVENOUS at 21:59

## 2017-12-28 RX ADMIN — SODIUM CHLORIDE 125 ML/HR: 0.9 INJECTION, SOLUTION INTRAVENOUS at 13:13

## 2017-12-28 NOTE — ED NOTES
Patient stating, "I am going to go home I'm signing out [facility] " Patient informed by both nurse and provider that she could not leave at this moment        Bertin Pope RN  12/27/17 9446

## 2017-12-28 NOTE — ED NOTES
Patient reports that pain is "punding in my head " Denies that tylenol has been effective  Physician notified  Will continue to monitor        Semaj Riojas RN  12/28/17 6079

## 2017-12-28 NOTE — DISCHARGE INSTRUCTIONS
Alcohol Intoxication   WHAT YOU NEED TO KNOW:   What is alcohol intoxication? Alcohol intoxication is a harmful physical condition caused when you drink more alcohol than your body can handle  It is also called ethanol poisoning, or being drunk  What are the physical signs and symptoms of alcohol intoxication? · Breath that smells like alcohol     · Blackouts or seizures    · Enlarged pupils    · Eye movements that are faster than normal for you    · Fast heartbeats and slow breaths     · Loss of balance, or no ability to walk straight or stand still    · Nausea and vomiting     · Slurred or loud speech  What behaviors are common with alcohol intoxication? · Quick mood changes: You feel happy and quickly become angry, or you easily become sad  You may act out violently  · Risky sexual behavior:  You have sex that is not protected, or you have sex with many people  · Work or school trouble: You have many absences or do not finish your work  How is alcohol intoxication diagnosed? Your healthcare provider will examine you  He will ask about your signs and symptoms and use of alcohol  These questions may include how much, how often, and what kind of alcohol you drink  He may ask you questions to test your memory and judgment  He may also send blood or urine samples to a lab  The samples are tested for alcohol and for signs of liver, kidney, or heart damage caused by alcohol  You may need to have these tests more than once  How is alcohol intoxication treated? · Medicines:      ¨ Sedative: This medicine is given to help you stay calm and relaxed  ¨ Antinausea medicine: This medicine may be given to calm your stomach and prevent vomiting  ¨ Glucose: This medicine may be given to increase the amount of sugar in your blood  ¨ Vitamin B1:  This is also called Thiamine  You may be given vitamin B1 if your levels are low from excess alcohol      · Brief intervention therapy:  A healthcare provider meets with you to discuss ways to control your risky behaviors, such as drinking and driving  This therapy also helps you set goals to decrease the amount of alcohol you drink  · Breathing support: You may need the following if you are so intoxicated that you cannot breathe well on your own:     Fayrene Cos You may need extra oxygen  if your blood oxygen level is lower than it should be  You may get oxygen through a mask placed over your nose and mouth or through small tubes placed in your nostrils  Ask your healthcare provider before you take off the mask or oxygen tubing  ¨ A ventilator  is a machine that gives you oxygen and breathes for you when you cannot breathe well on your own  An endotracheal (ET) tube is put into your mouth or nose and attached to the ventilator  You may need a trach if an ET tube cannot be placed  A trach is a tube put through an incision and into your windpipe  What are the risks of alcohol intoxication? Alcohol intoxication puts you at risk for disease and injury  Alcohol can damage your brain, liver, heart, kidneys, and lungs  You may be more likely to act violently when you are intoxicated  You may break the law, or harm yourself and others  Risky sexual behavior could lead to sexually transmitted diseases (STDs)  Alcohol intoxication and poisoning can put you into a coma (sleep that you cannot wake up from) and may be life-threatening  Where can I find more information? · Alcoholics Anonymous  Web Address: http://www momondo/  When should I contact my healthcare provider? Contact your healthcare provider if:  · You need help to stop drinking alcohol  · You have trouble with work or school because you drink too much alcohol  · You have physical or verbal fights because of alcohol  · You have questions or concerns about your condition or care  When should I seek immediate care?   Seek care immediately or call 911 if:  · You have sudden trouble breathing or chest pain     · You have a seizure  · You feel sad enough to harm yourself or others  · You have hallucinations (you see or hear things that are not real)  · You cannot stop vomiting  · You were in an accident because of alcohol  CARE AGREEMENT:   You have the right to help plan your care  Learn about your health condition and how it may be treated  Discuss treatment options with your caregivers to decide what care you want to receive  You always have the right to refuse treatment  The above information is an  only  It is not intended as medical advice for individual conditions or treatments  Talk to your doctor, nurse or pharmacist before following any medical regimen to see if it is safe and effective for you  © 2017 [address] Information is for End User's use only and may not be sold, redistributed or otherwise used for commercial purposes  All illustrations and images included in CareNotes® are the copyrighted property of A D A M , Inc  or Rustam Ty  Alcohol Use Disorder   WHAT YOU NEED TO KNOW:   Alcohol use disorder (AUD) is problem drinking  AUD includes alcohol abuse and alcohol dependency  DISCHARGE INSTRUCTIONS:   Seek care immediately if:   · Your heart is beating faster than usual     · You have hallucinations  · You cannot remember what happens while you are drinking  · You have seizures  Contact your healthcare provider if:   · You are anxious and have nausea  · Your hands are shaky and you are sweating heavily  · You have questions or concerns about your condition or care  Follow up with your healthcare provider as directed:  Do not try to stop drinking on your own  Your healthcare provider may need to help you withdraw from alcohol safely   He may need to admit you to the hospital  You may also need any of the following treatments:  · Medicines to decrease your craving for alcohol    · Support groups such as Alcoholics Anonymous     · Therapy from a psychiatrist or psychologist    · Admission to an inpatient facility for treatment for severe AUD  For support and more information:   · Substance Abuse and [address] , [address] [place]  Web Address: https://[employer].com/  · Alcoholics Anonymous  Web Address: http://[employer]/  © 2017 [address] Information is for End User's use only and may not be sold, redistributed or otherwise used for commercial purposes  All illustrations and images included in CareNotes® are the copyrighted property of [employer] "[employer]  or Rustam Crawford  The above information is an  only  It is not intended as medical advice for individual conditions or treatments  Talk to your doctor, nurse or pharmacist before following any medical regimen to see if it is safe and effective for you

## 2017-12-28 NOTE — ED NOTES
Family still at bedside, patient resting, and easily able to wake up     Roman Miu, RN  12/28/17 6573

## 2017-12-28 NOTE — ED NOTES
Family reports patient making suicidal comments  Patient states, " I'm not going to hurt myself I just want to go home " Provider notified        Navdeep Santillan RN  12/27/17 1106

## 2017-12-28 NOTE — ED CARE HANDOFF
Emergency Department Sign Out Note        Sign out and transfer of care from SANDRO Luis  See Separate Emergency Department note  The patient, Alverta Heimlich, was evaluated by the previous provider for  Alcohol abuse  Workup Completed:   crisis did speak with patient and daughter at this time she is not suicidal and she was given outpatient follow-up  material    ED Course / Workup Pending (followup):   outpatient follow-up                          ED Course      Procedures  MDM  CritCare Time      Disposition  Final diagnoses:   Alcohol intoxication ([facility] )   Drug overdose     Time reflects when diagnosis was documented in both MDM as applicable and the Disposition within this note     Time User Action Codes Description Comment    12/27/2017  3:10 PM Xi Sharma Add [F10 929] Alcohol intoxication ([facility] )     12/27/2017  3:10 PM Carmen Haskins Drug overdose       ED Disposition     ED Disposition Condition Comment    Discharge   Outpatient follow-up        MD Documentation    Flowsheet Row Most Recent Value   Sending MD  ARIC      Follow-up Information     Follow up With Specialties Details Why Contact Delfin Borges, DO Internal Medicine In 1 day  and with rehab information given to you Fernando  [address] [phone]          Patient's Medications   Discharge Prescriptions    No medications on file     No discharge procedures on file         ED Provider  Electronically Signed by

## 2017-12-28 NOTE — ED NOTES
Patient appears to be sleeping  Symmetrical chest rise, no facial grimace, and comfortable position noted  Family remains at bedside  Will continue to monitor        Elia Wick RN  12/28/17 0077

## 2017-12-28 NOTE — ED NOTES
Pt given resources supplied by Crisis  Aime Winters, for drug/alcohol rehab, pt verb janelle Godinez, RN  12/28/17 7712

## 2017-12-28 NOTE — ED NOTES
Pt brought in for altered mental status while intoxicated  Pt family states that she has voiced thoughts of suicide but has made no mention of a plan and each time is while intoxicated  Pt does admit to depression and not being happy with her life at the present time but denies SI/HI/AH/VH  Per daughter she is a recovering alcoholic and relasped a few years ago  Pt admits to drinking a bottle of wine yesterday but denied daily use  Pt agreed to take the OP Drug and Alcohol packet for treatment

## 2017-12-28 NOTE — ED NOTES
Breath alcohol 0 050  Alert W/A, speech clear  Daughters at bedside  Call to Crisis         Yue Solorzano, RN  12/28/17 3978

## 2017-12-28 NOTE — ED NOTES
IV accidentally pulled out with pt repositioning, angio intact, dressing to site, left AC       Gunner Torrez RN  12/28/17 2950

## 2017-12-28 NOTE — ED NOTES
Family still at bedside, patient resting, and easily able to wake up         Larrie Mortimer, RN  12/28/17 5070

## 2017-12-28 NOTE — ED PROVIDER NOTES
History  Chief Complaint   Patient presents with    Alcohol Intoxication     Presents to ED via EMS for detox from alcohol and depression  Denies any SI or HI at this time  Patient states that she is agreeable to admission for detox  Denies ataking any additional medications  This is a 55-year-old female brought in by ambulance for alcohol intoxication with suicidal thoughts  Patient was here yesterday with similar symptoms but when she sobered up in the middle the night she denied suicidal thoughts and requested to leave  Today she was drinking and her housemates called the ambulance  She was noted to have of 15 second seizure witnessed by her daughter  She was gagging on to tried to vomit and then she rolled back in her eyes rolled back she vomited more and then had stiffening for 15 seconds  She was little bit confused more than normal after that  She drinks whiskey she has chronic alcoholism and she has been to rehab in the past but she does not want to go there now because she has an animal shelter and the animals are very therapeutic  Her children are very concerned and would like her placed in a psychiatric facility at the least             Prior to Admission Medications   Prescriptions Last Dose Informant Patient Reported? Taking?    ALPRAZolam (XANAX) 0 5 mg tablet   Yes Yes   Sig: Take 1 tablet by mouth 2 (two) times a day as needed   Multiple Vitamins-Iron (QC DAILY MULTIVITAMINS/IRON) TABS   Yes Yes   Sig: Take 1 tablet by mouth daily   albuterol (PROVENTIL HFA,VENTOLIN HFA) 90 mcg/act inhaler   No No   Sig: Inhale 2 puffs every 4 (four) hours as needed for wheezing   budesonide-formoterol (SYMBICORT) 160-4 5 mcg/act inhaler   Yes Yes   Sig: Inhale   traMADol (ULTRAM) 50 mg tablet   Yes Yes   Sig: Take by mouth   venlafaxine (EFFEXOR-XR) 150 mg 24 hr capsule   Yes Yes   Sig: Take by mouth      Facility-Administered Medications: None       Past Medical History:   Diagnosis Date    Anxiety  Skin cancer of scalp     Substance abuse        Past Surgical History:   Procedure Laterality Date    ANTERIOR CRUCIATE LIGAMENT REPAIR Bilateral     3 left 2 right    CYST REMOVAL Left     wrist    FOOT NEUROMA SURGERY Left     HAND DEBRIDEMENT Right 11/22/2016    Procedure: irrigation &  DEBRIDEMENT HAND/FINGER right;  Surgeon: Jaqui Lau MD;  [facility];  Service:     SHOULDER ARTHROSCOPY W/ ROTATOR CUFF REPAIR Right        History reviewed  No pertinent family history  I have reviewed and agree with the history as documented  Social History   Substance Use Topics    Smoking status: Former Smoker    Smokeless tobacco: Never Used    Alcohol use No        Review of Systems   Neurological: Positive for headaches  All other systems reviewed and are negative  Physical Exam  ED Triage Vitals [12/28/17 1257]   Temperature Pulse Respirations Blood Pressure SpO2   98 °F (36 7 °C) 69 20 129/72 98 %      Temp Source Heart Rate Source Patient Position - Orthostatic VS BP Location FiO2 (%)   Temporal Monitor Sitting Left arm --      Pain Score       No Pain           Orthostatic Vital Signs  Vitals:    12/29/17 1030 12/29/17 1045 12/29/17 1130 12/29/17 1145   BP: (!) 175/94  (!) 175/94    Pulse: 78 74 75 73   Patient Position - Orthostatic VS: Lying          Physical Exam   Constitutional: She is oriented to person, place, and time  She appears well-developed and well-nourished  HENT:   Mouth/Throat: Mucous membranes are dry  Eyes: Conjunctivae and EOM are normal  Pupils are equal, round, and reactive to light  Neck: Normal range of motion  Neck supple  No spinous process tenderness present  Cardiovascular: Regular rhythm, normal heart sounds and intact distal pulses  Tachycardia present  Pulmonary/Chest: Effort normal and breath sounds normal  No respiratory distress  She has no wheezes  Abdominal: Soft  Bowel sounds are normal  She exhibits no distension   There is no tenderness  Musculoskeletal: Normal range of motion  Neurological: She is alert and oriented to person, place, and time  She has normal strength  No cranial nerve deficit or sensory deficit  Coordination abnormal  GCS eye subscore is 4  GCS verbal subscore is 5  GCS motor subscore is 6  Skin: Skin is warm and dry  No rash noted  Psychiatric: Her speech is slurred  She is withdrawn  Cognition and memory are normal  She expresses impulsivity  She exhibits a depressed mood  She expresses no suicidal plans and no homicidal plans  Patient states she drank alcohol and took pills to feel better  She is depressed because her mom , she got , and recently lost her job  Nursing note and vitals reviewed        ED Medications  Medications   ondansetron (ZOFRAN) injection 4 mg (4 mg Intravenous Given 17 1303)   thiamine (VITAMIN B1) 100 mg in sodium chloride 0 9 % 50 mL IVPB (0 mg Intravenous Stopped 17 1708)   acetaminophen (TYLENOL) tablet 975 mg (975 mg Oral Given 17 1646)   ketorolac (TORADOL) injection 30 mg (30 mg Intravenous Given 17 1750)   ondansetron (ZOFRAN) injection 4 mg (4 mg Intravenous Given 17 2159)   LORazepam (ATIVAN) tablet 1 mg (1 mg Oral Given 17 0554)   chlordiazePOXIDE (LIBRIUM) capsule 50 mg (50 mg Oral Given 17 0803)   LORazepam (ATIVAN) tablet 1 mg (1 mg Oral Given 17 1047)   ondansetron (ZOFRAN-ODT) dispersible tablet 4 mg (4 mg Oral Given 17 1048)   chlordiazePOXIDE (LIBRIUM) capsule 25 mg (25 mg Oral Given 17 1047)   acetaminophen (TYLENOL) tablet 975 mg (975 mg Oral Given 17 1138)       Diagnostic Studies  Results Reviewed     Procedure Component Value Units Date/Time    POCT alcohol breath test [[id number]]  (Normal) Resulted:  17    Lab Status:  Final result Updated:  17     EXTBreath Alcohol 0 098    POCT alcohol breath test [[id number]]  (Normal) Resulted:  17    Lab Status: Final result Updated:  12/28/17 1927     EXTBreath Alcohol 0 114    POCT alcohol breath test [[id number]]  (Normal) Resulted:  12/28/17 1705    Lab Status:  Final result Updated:  12/28/17 1705     EXTBreath Alcohol 0 157    Rapid drug screen, urine [[id number]]  (Normal) Collected:  12/28/17 1537    Lab Status:  Final result Specimen:  Urine from Urine, Clean Catch Updated:  12/28/17 1604     Amph/Meth UR Negative     Barbiturate Ur Negative     Benzodiazepine Urine Negative     Cocaine Urine Negative     Methadone Urine Negative     Opiate Urine Negative     PCP Ur Negative     THC Urine Negative    Narrative:         FOR MEDICAL PURPOSES ONLY  IF CONFIRMATION NEEDED PLEASE CONTACT THE LAB WITHIN 5 DAYS      Drug Screen Cutoff Levels:  AMPHETAMINE/METHAMPHETAMINES  1000 ng/mL  BARBITURATES     200 ng/mL  BENZODIAZEPINES     200 ng/mL  COCAINE      300 ng/mL  METHADONE      300 ng/mL  OPIATES      300 ng/mL  PHENCYCLIDINE     25 ng/mL  THC       50 ng/mL    Prolactin [[id number]]  (Normal) Collected:  12/28/17 1302    Lab Status:  Final result Specimen:  Blood from Arm, Left Updated:  12/28/17 1538     Prolactin 27 7 ng/mL     Comprehensive metabolic panel [[id number]]  (Abnormal) Collected:  12/28/17 1302    Lab Status:  Final result Specimen:  Blood from Arm, Left Updated:  12/28/17 1355     Sodium 139 mmol/L      Potassium 4 0 mmol/L      Chloride 102 mmol/L      CO2 27 mmol/L      Anion Gap 10 mmol/L      BUN 9 mg/dL      Creatinine 0 83 mg/dL      Glucose 99 mg/dL      Calcium 8 9 mg/dL      AST 28 U/L      ALT 33 U/L      Alkaline Phosphatase 118 (H) U/L      Total Protein 7 7 g/dL      Albumin 4 2 g/dL      Total Bilirubin 0 40 mg/dL      eGFR 82 ml/min/1 73sq m     Narrative:         National Kidney Disease Education Program recommendations are as follows:  GFR calculation is accurate only with a steady state creatinine  Chronic Kidney disease less than 60 ml/min/1 73 sq  meters  Kidney failure less than 15 ml/min/1 73 sq  meters  Acetaminophen level [[id number]]  (Abnormal) Collected:  12/28/17 1302    Lab Status:  Final result Specimen:  Blood from Arm, Left Updated:  12/28/17 1354     Acetaminophen Level <2 (L) ug/mL     Salicylate level [[id number]]  (Abnormal) Collected:  12/28/17 1302    Lab Status:  Final result Specimen:  Blood from Arm, Left Updated:  17/70/31 6557     Salicylate Lvl <3 (L) mg/dL     Ethanol level [[id number]]  (Abnormal) Collected:  12/28/17 1302    Lab Status:  Final result Specimen:  Blood from Arm, Left Updated:  12/28/17 1350     Ethanol Lvl 292 (H) mg/dL     Magnesium [[id number]]  (Normal) Collected:  12/28/17 1302    Lab Status:  Final result Specimen:  Blood from Arm, Left Updated:  12/28/17 1349     Magnesium 2 2 mg/dL     CBC and differential [[id number]]  (Abnormal) Collected:  12/28/17 1312    Lab Status:  Final result Specimen:  Blood from Arm, Left Updated:  12/28/17 1332     WBC 7 71 Thousand/uL      RBC 4 17 Million/uL      Hemoglobin 11 6 g/dL      Hematocrit 36 5 %      MCV 88 fL      MCH 27 8 pg      MCHC 31 8 g/dL      RDW 15 2 (H) %      MPV 8 3 (L) fL      Platelets 295 Thousands/uL      Neutrophils Relative 64 %      Lymphocytes Relative 31 %      Monocytes Relative 4 %      Eosinophils Relative 1 %      Basophils Relative 0 %      Neutrophils Absolute 4 96 Thousands/µL      Lymphocytes Absolute 2 37 Thousands/µL      Monocytes Absolute 0 27 Thousand/µL      Eosinophils Absolute 0 08 Thousand/µL      Basophils Absolute 0 03 Thousands/µL                  XR chest portable   ED Interpretation by Jasmeet Watters DO (12/28 2344)   No acute abnl      Final Result by Lalita Velazquez MD (12/28 9286)      No active pulmonary disease  Workstation performed: [id number]         CT head without contrast   Final Result by Lalita Velazquez MD (12/28 2645)      No acute intracranial abnormality           Workstation performed: [id number]                    Procedures  ECG 12 Lead Documentation  Date/Time: 12/28/2017 2:15 PM  Performed by: Adrian Rodriguez by: Jovana Jacques     Indications / Diagnosis:  Alcohol intoxication  ECG reviewed by me, the ED Provider: yes    Patient location:  ED  Previous ECG:     Previous ECG:  Compared to current    Comparison ECG info:  6-17 normal    Similarity:  Changes noted  Interpretation:     Interpretation: normal    Rate:     ECG rate:  56    ECG rate assessment: bradycardic    Rhythm:     Rhythm: sinus bradycardia    Ectopy:     Ectopy: none    QRS:     QRS axis:  Normal    QRS intervals:  Normal  Conduction:     Conduction: normal    ST segments:     ST segments:  Normal  T waves:     T waves: normal             Phone Contacts  ED Phone Contact    ED Course  ED Course as of Dec 30 0826   u Dec 28, 2017   Ita Scanlon - seizure will not be treated with medications as it may be from alcoholism or substance abuse    1832 It was requested by [facility] Dept of mental health to have the family fill a petition as a back-up before the patient becomes sober      [address] [place] [address] to call [place], the delegate         signed out to Dr Shamika Chicas                        [facility]  Number of Diagnoses or Management Options  Alcohol intoxication [facility]): new and requires workup  Polysubstance abuse: new and requires workup  Seizure [facility]): new and requires workup  Suicidal ideation: new and requires workup     Amount and/or Complexity of Data Reviewed  Clinical lab tests: ordered and reviewed  Tests in the radiology section of CPT®: ordered and reviewed  Obtain history from someone other than the patient: yes  Discuss the patient with other providers: yes    Patient Progress  Patient progress: improved    CritCare Time    Disposition  Final diagnoses:   Seizure ([facility] [address] ) - new-onset   Alcohol intoxication ([facility] [address] )   Polysubstance abuse   Suicidal ideation     Time reflects when diagnosis was documented in both MDM as applicable and the Disposition within this note     Time User Action Codes Description Comment    12/28/2017  3:32 PM Philip Juan Add [R56 9] Seizure ([facility] [address] )     12/28/2017  3:32 PM Philip Juan Modify [R56 9] Seizure [facility]) new-onset    12/28/2017  3:32 PM Philip Juan Add [F10 929] Alcohol intoxication ([facility] [address] )     12/28/2017  3:32 PM Philip Humboldt Add [F19 10] Polysubstance abuse     12/28/2017  3:33 PM Philip Juan Add [Q18 967] Suicidal ideation       ED Disposition     ED Disposition Condition Comment    Transfer to Another [facility] [address] should be transferred out to [place], accepted by       MD Documentation    [address] Most Recent Value   Patient Condition  The patient has been stabilized such that within reasonable medical probability, no material deterioration of the patient condition or the condition of the unborn child(cait) is likely to result from the transfer   Reason for Transfer  Level of Care needed not available at this facility [Inpatient Alcohol rehabilitation]   Benefits of Transfer  Specialized equipment and/or services available at the receiving facility (Include comment)________________________   Risks of Transfer  Potential for delay in receiving treatment, Potential deterioration of medical condition, Increased discomfort during transfer   Accepting Physician  [address] Name, [address]    (Name & Tel number)  Ayleen Stai, Crisis   Transported by (Company and Unit #)  POV   Sending MD Dr Arianna Gillis   Provider Certification  The patient is stable for psychiatric transfer because they are medically stable, and is protected from harming him/herself or others during transport      RN Documentation    Flowsheet Row Most [address] [facility] [address] Name, [address]    (Name & Tel number)  Ayleen Stai, Crisis   Transported by [employer] and Unit #)  POV      Follow-up Information    None       Discharge Medication List as of 12/29/2017 12:08 PM      CONTINUE these medications which have NOT CHANGED    Details   ALPRAZolam (XANAX) 0 5 mg tablet Take 1 tablet by mouth 2 (two) times a day as needed, Starting Thu 9/3/2015, Historical Med      budesonide-formoterol (SYMBICORT) 160-4 5 mcg/act inhaler Inhale, Starting Thu 12/21/2017, Historical Med      Multiple Vitamins-Iron (QC DAILY MULTIVITAMINS/IRON) TABS Take 1 tablet by mouth daily, Starting Mon 1/28/2013, Historical Med      traMADol (ULTRAM) 50 mg tablet Take by mouth, Starting Tue 1/26/2016, Historical Med      venlafaxine (EFFEXOR-XR) 150 mg 24 hr capsule Take by mouth, Starting Wed 5/11/2016, Historical Med      albuterol (PROVENTIL HFA,VENTOLIN HFA) 90 mcg/act inhaler Inhale 2 puffs every 4 (four) hours as needed for wheezing, Starting Tue 6/13/2017, Print           No discharge procedures on file      ED Provider  Electronically Signed by           Tressa Rossi DO  12/30/17 7757

## 2017-12-28 NOTE — ED NOTES
Call from [address], updated, phone transferred to pt  Pt's daughter also wants to speak to , [facility] made aware       Dalia Piedra RN  12/28/17 0930

## 2017-12-29 VITALS
OXYGEN SATURATION: 100 % | DIASTOLIC BLOOD PRESSURE: 94 MMHG | SYSTOLIC BLOOD PRESSURE: 175 MMHG | TEMPERATURE: 98.3 F | RESPIRATION RATE: 20 BRPM | HEART RATE: 73 BPM | BODY MASS INDEX: 29.05 KG/M2 | WEIGHT: 180 LBS

## 2017-12-29 LAB
ATRIAL RATE: 56 BPM
P AXIS: 62 DEGREES
PR INTERVAL: 164 MS
QRS AXIS: 8 DEGREES
QRSD INTERVAL: 92 MS
QT INTERVAL: 440 MS
QTC INTERVAL: 424 MS
T WAVE AXIS: 50 DEGREES
VENTRICULAR RATE: 56 BPM

## 2017-12-29 PROCEDURE — 96361 HYDRATE IV INFUSION ADD-ON: CPT

## 2017-12-29 PROCEDURE — 99285 EMERGENCY DEPT VISIT HI MDM: CPT

## 2017-12-29 RX ORDER — ONDANSETRON 4 MG/1
4 TABLET, ORALLY DISINTEGRATING ORAL ONCE
Status: COMPLETED | OUTPATIENT
Start: 2017-12-29 | End: 2017-12-29

## 2017-12-29 RX ORDER — ACETAMINOPHEN 325 MG/1
975 TABLET ORAL ONCE
Status: COMPLETED | OUTPATIENT
Start: 2017-12-29 | End: 2017-12-29

## 2017-12-29 RX ORDER — LORAZEPAM 1 MG/1
1 TABLET ORAL ONCE
Status: COMPLETED | OUTPATIENT
Start: 2017-12-29 | End: 2017-12-29

## 2017-12-29 RX ORDER — CHLORDIAZEPOXIDE HYDROCHLORIDE 25 MG/1
50 CAPSULE, GELATIN COATED ORAL ONCE
Status: COMPLETED | OUTPATIENT
Start: 2017-12-29 | End: 2017-12-29

## 2017-12-29 RX ORDER — CHLORDIAZEPOXIDE HYDROCHLORIDE 25 MG/1
25 CAPSULE, GELATIN COATED ORAL ONCE
Status: COMPLETED | OUTPATIENT
Start: 2017-12-29 | End: 2017-12-29

## 2017-12-29 RX ORDER — CHLORDIAZEPOXIDE HYDROCHLORIDE 25 MG/1
75 CAPSULE, GELATIN COATED ORAL EVERY 6 HOURS SCHEDULED
Status: DISCONTINUED | OUTPATIENT
Start: 2017-12-29 | End: 2017-12-29

## 2017-12-29 RX ADMIN — CHLORDIAZEPOXIDE HYDROCHLORIDE 50 MG: 25 CAPSULE ORAL at 08:03

## 2017-12-29 RX ADMIN — CHLORDIAZEPOXIDE HYDROCHLORIDE 25 MG: 25 CAPSULE ORAL at 10:47

## 2017-12-29 RX ADMIN — SODIUM CHLORIDE 125 ML/HR: 0.9 INJECTION, SOLUTION INTRAVENOUS at 06:13

## 2017-12-29 RX ADMIN — ACETAMINOPHEN 975 MG: 325 TABLET, FILM COATED ORAL at 11:38

## 2017-12-29 RX ADMIN — LORAZEPAM 1 MG: 1 TABLET ORAL at 10:47

## 2017-12-29 RX ADMIN — ONDANSETRON 4 MG: 4 TABLET, ORALLY DISINTEGRATING ORAL at 10:48

## 2017-12-29 RX ADMIN — LORAZEPAM 1 MG: 1 TABLET ORAL at 05:54

## 2017-12-29 NOTE — ED NOTES
[facility] denied patient because of concerns about seizures  Called [facility] for a rehab bed and was told that rehab beds are on a waiting list and they are not taking referrals at this time  Also called [facility] and they have no rehab beds available at this time

## 2017-12-29 NOTE — ED NOTES
CW spoke with pt who has stated that she does not want to go to [facility] called Mare Braeden (admissions) and informed her that pt has declined [place]  CW called [facility] and spoke with Kath Clarke ([facility]) who took the referral [phone]- File# [id number] and informed me that the day shift admissions would contact CW in the AM to see if a bed is available  CW informed ED Physician of this as well    Bed search      [facility] Crisis Worker

## 2017-12-29 NOTE — ED NOTES
Patient declined breakfast, patient asked for diet coke  Patient provided with diet coke  No noted tremors of patient  Patient stating she gets hot and then chilled  Patient requesting ativan  Dr Heather Mcclure aware  Patient given Librium po  Resting in bed with lights off  Son called and given update on mothers condition       Hilda Rogers RN  12/29/17 5575

## 2017-12-29 NOTE — ED NOTES
CW called [address] [place] [address] spoke with Romina Tidwell (admissions) who informed me to fax clinical; Clinical has been faxed  Romina Tidwell also informed me that pt has a co-pay of $3,250 that must be paid prior to starting treatment, CW spoke with pt about this , pt informed CW that she has the money to cover the co-pay      [address] [place] [address]

## 2017-12-29 NOTE — EMTALA/ACUTE CARE TRANSFER
[facility] [address] [place] [address] [address] [address]  Dept: [phone]      EMTALA TRANSFER CONSENT    NAME Jo Ann Henriquez                                         1965                              MRN [id number]    I have been informed of my rights regarding examination, treatment, and transfer   by Dr Jocy Pennington DO    Benefits: Specialized equipment and/or services available at the receiving facility (Include comment)________________________    Risks: Potential for delay in receiving treatment, Potential deterioration of medical condition, Increased discomfort during transfer      Transfer Request   I acknowledge that my medical condition has been evaluated and explained to me by the emergency department physician or other qualified medical person and/or my attending physician who has recommended and offered to me further medical examination and treatment  I understand the Hospital's obligation with respect to the treatment and stabilization of my emergency medical condition  I nevertheless request to be transferred  I release the Hospital, the doctor, and any other persons caring for me from all responsibility or liability for any injury or ill effects that may result from my transfer and agree to accept all responsibility for the consequences of my choice to transfer, rather than receive stabilizing treatment at the Hospital  I understand that because the transfer is my request, my insurance may not provide reimbursement for the services  The Hospital will assist and direct me and my family in how to make arrangements for transfer, but the hospital is not liable for any fees charged by the transport service  In spite of this understanding, I refuse to consent to further medical examination and treatment which has been offered to me, and request transfer to [address], [address] : [place] Hernandes   I authorize the performance of emergency medical procedures and treatments upon me in both transit and upon arrival at the receiving facility  Additionally, I authorize the release of any and all medical records to the receiving facility and request they be transported with me, if possible  I authorize the performance of emergency medical procedures and treatments upon me in both transit and upon arrival at the receiving facility  Additionally, I authorize the release of any and all medical records to the receiving facility and request they be transported with me, if possible  I understand that the safest mode of transportation during a medical emergency is an ambulance and that the Hospital advocates the use of this mode of transport  Risks of traveling to the receiving facility by car, including absence of medical control, life sustaining equipment, such as oxygen, and medical personnel has been explained to me and I fully understand them  (NIKOLAI CORRECT BOX BELOW)  [  ]  I consent to the stated transfer and to be transported by ambulance/helicopter  [XXX]  I consent to the stated transfer, but refuse transportation by ambulance and accept full responsibility for my transportation by car  I understand the risks of non-ambulance transfers and I exonerate the Hospital and its staff from any deterioration in my condition that results from this refusal     X___________________________________________    DATE  17  TIME________  Signature of patient or legally responsible individual signing on patient behalf           RELATIONSHIP TO PATIENT_________________________          Provider Certification    NAME Getachew Arora Eda                                         1965                              MRN [id number]    A medical screening exam was performed on the above named patient  Based on the examination:    Condition Necessitating Transfer The primary encounter diagnosis was Seizure [facility])   Diagnoses of Alcohol intoxication ([facility] [address] ), Polysubstance abuse, and Suicidal ideation were also pertinent to this visit  Patient Condition: The patient has been stabilized such that within reasonable medical probability, no material deterioration of the patient condition or the condition of the unborn child(cait) is likely to result from the transfer    Reason for Transfer: Level of Care needed not available at this facility (Inpatient Alcohol rehabilitation)    Transfer Requirements: Leonel Mcdonald   · Space available and qualified personnel available for treatment as acknowledged by Avtar Dozier  · Agreed to accept transfer and to provide appropriate medical treatment as acknowledged by          · Appropriate medical records of the examination and treatment of the patient are provided at the time of transfer   [facility], [address] _______  · Transfer will be performed by qualified personnel from [place] [address]  and appropriate transfer equipment as required, including the use of necessary and appropriate life support measures  Provider Certification: I have examined the patient and explained the following risks and benefits of being transferred/refusing transfer to the patient/family:  The patient is stable for psychiatric transfer because they are medically stable, and is protected from harming him/herself or others during transport      Based on these reasonable risks and benefits to the patient and/or the unborn child(cait), and based upon the information available at the time of the patients examination, I certify that the medical benefits reasonably to be expected from the provision of appropriate medical treatments at another medical facility outweigh the increasing risks, if any, to the individuals medical condition, and in the case of labor to the unborn child, from effecting the transfer      X____________________________________________ DATE 12/29/17        TIME_______      ORIGINAL - SEND TO MEDICAL RECORDS   COPY - SEND WITH PATIENT DURING TRANSFER

## 2017-12-29 NOTE — ED NOTES
Called [facility] and was told that they will have rehab beds for females opening up in the morning  Information was faxed to them 12/28/17 @ 11:53  [facility] and [facility] can also be tried in the morning in the event that [facility] does not accept for some reason

## 2017-12-29 NOTE — ED NOTES
Patient received breakfast tray  Sitting upright and eating in bed       Yevgeniy Isaac RN  12/29/17 5092

## 2017-12-29 NOTE — ED NOTES
Family in at bedside, came out to this nurse and explained that they found that [facility] had a bed and would take her but they needed all the paperwork from the last 3 days faxed to them  Discussed with daughter and roommate that I would page crisis and get them on the task  Rosibel paged  Rosibel called back and given an update on patient and paperwork need  Rosibel called back 10 minutes later and informed this nurse that he faxed the info and then called sharon to make sure they got the info  Rosibel will call back when he hears any info after the DR reviews  All this info given to family       Jan Duncan RN  12/29/17 6955

## 2017-12-29 NOTE — ED NOTES
[facility] and pressed one for admissions, but no one answers  [address], they have no beds at this time, told to call tomorrow after 9 o'clock when they go over discharges for the day and look at bed availability

## 2017-12-29 NOTE — ED NOTES
Crisis  here for another pt, aware of [facility] declining admission  Bed search pending        Nolen Litten, RN  12/29/17 4282

## 2017-12-29 NOTE — ED NOTES
Report received from [facility]  Pt remains sleeping in bed  VS remain stable from previous assessments  Fluids running at 125 mL/hr        Bryon Jung RN  12/29/17 3381

## 2017-12-29 NOTE — ED NOTES
Family stating that [facility] will accept pt, but pt wants to continue bed search, will only go to [facility] a last resort"  Pt currently agreeable to staying at hospital until placement at a detox facility is done       Kimberly Lowry RN  12/28/17 7594

## 2017-12-29 NOTE — ED NOTES
CW called pt insurance [facility] [phone] and spoke with Brittany Steen  (customer serv rep) who was able to complete Pre-Cert and Auth # 6 Days with review on 1/2/6085  using Quick Cert   Auth# [id number]    Myla [facility] Crisis Worker

## 2017-12-29 NOTE — ED NOTES
Pt awoke spontaneously, tearful, tremors of both arms, "I can't stop shaking", requesting medication for nerves, discussed with Dr Noy Ross, order noted  HR 86, resp rate 30/minute, SaO2 98%              Donna Vieyra RN  12/29/17 5457

## 2017-12-29 NOTE — ED NOTES
[facility] called ED and reported they will not take patient due to her possibly having a seizure today, per pt report       Sofi Zarco RN  12/29/17 8947

## 2017-12-29 NOTE — ED NOTES
Patient appears to be sleeping  Symmetrical chest rise, no facial grimace, and comfortable position noted  Will continue to monitor        Meli Schaffer RN  12/28/17 1950

## 2017-12-29 NOTE — ED NOTES
Delaney Rogel from crisis called, patient has been accepted at [facility]  Family to transport to facility as per family and Dr Chula Barba, RN  12/29/17 0646

## 2017-12-29 NOTE — ED NOTES
OOB and ambulated to bathroom without difficulty   Patient ordered breakfast      Chad Coffey RN  12/29/17 7653

## 2017-12-29 NOTE — ED NOTES
CW was paged by ED Physician about pt family filling a 36 petition, ED Physician informed CW that pt is not able to be evaluated due to pt BAL is still high  CW spoke with pt daughter Irving Spangler who informed me that she and her sister would like to do a 36 petition and that they spoke with [facility] [address]  Who informed them that they could file a 302 petition  CW confirmed that as well and informed them that once pt is evaluated it would be determined at that point if 302 would need to be called in to the delegate  CW called [facility] Delegate [place] who informed me that she spoke with pt family about filling a 302 petition  CW then informed ED Physician about have pt family complete 36 petition  CW will follow up once pt is medically cleared to be evaluated      [address] [place] [address]

## 2017-12-29 NOTE — ED NOTES
Patient stating she is getting shaky and nauseous  Dr Gab Scales aware  Patient given medicines       Ricco Otto RN  12/29/17 4175

## 2017-12-29 NOTE — ED NOTES
Security contacted as family barging through registration doors despite discussing hospital policies  Family refusing to give patient privacy despite speaking with crisis on telephone  Family keeps stating, "No, I'm not leaving her side  You guys sent her home to die  I want to talk to a supervisor on how to file a formal lawsuit  I have already spoke with the police and [facility] about it   You guys have been absolutely horrific " Again, this RN stated, "I'm sorry that happened but our policy is 2 visitors at a time " Nursing supervisor contacted per visitor request  Katelynn Marie at bedside        Albania Moreno RN  12/28/17 2056

## 2017-12-29 NOTE — ED NOTES
Resting on stretcher, sleeping, arouses easily  Alert and oriented W/A  Speech clear  Resp unlabored  Pt declines offer of PO nutrition  Pt and family aware of bed search for detox, pt and family prefer dual diagnosis facility  Warm blankets provided  No visitors at present        Paradise Allen RN  12/28/17 1233

## 2017-12-29 NOTE — ED CARE HANDOFF
Emergency Department Sign Out Note        Sign out and transfer of care from Dr Salgado Skill  See Separate Emergency Department note  The patient, Olive Woods, was evaluated by the previous provider for alcohol abuse, requesting rehab  Workup Completed:  Medically cleared, bedsearch in progress    ED Course / Workup Pending (followup): Labs Reviewed   CBC AND DIFFERENTIAL - Abnormal        Result Value Ref Range Status    WBC 7 71  4 31 - 10 16 Thousand/uL Final    RBC 4 17  3 81 - 5 12 Million/uL Final    Hemoglobin 11 6  11 5 - 15 4 g/dL Final    Hematocrit 36 5  34 8 - 46 1 % Final    MCV 88  82 - 98 fL Final    MCH 27 8  26 8 - 34 3 pg Final    MCHC 31 8  31 4 - 37 4 g/dL Final    RDW 15 2 (*) 11 6 - 15 1 % Final    MPV 8 3 (*) 8 9 - 12 7 fL Final    Platelets 460  587 - 390 Thousands/uL Final    Neutrophils Relative 64  43 - 75 % Final    Lymphocytes Relative 31  14 - 44 % Final    Monocytes Relative 4  4 - 12 % Final    Eosinophils Relative 1  0 - 6 % Final    Basophils Relative 0  0 - 1 % Final    Neutrophils Absolute 4 96  1 85 - 7 62 Thousands/µL Final    Lymphocytes Absolute 2 37  0 60 - 4 47 Thousands/µL Final    Monocytes Absolute 0 27  0 17 - 1 22 Thousand/µL Final    Eosinophils Absolute 0 08  0 00 - 0 61 Thousand/µL Final    Basophils Absolute 0 03  0 00 - 0 10 Thousands/µL Final   COMPREHENSIVE METABOLIC PANEL - Abnormal     Sodium 139  136 - 145 mmol/L Final    Potassium 4 0  3 5 - 5 3 mmol/L Final    Chloride 102  100 - 108 mmol/L Final    CO2 27  21 - 32 mmol/L Final    Anion Gap 10  4 - 13 mmol/L Final    BUN 9  5 - 25 mg/dL Final    Creatinine 0 83  0 60 - 1 30 mg/dL Final    Comment: Standardized to IDMS reference method    Glucose 99  65 - 140 mg/dL Final    Comment:   If the patient is fasting, the ADA then defines impaired fasting glucose as > 100 mg/dL and diabetes as > or equal to 123 mg/dL    Specimen collection should occur prior to Sulfasalazine administration due to the potential for falsely depressed results  Specimen collection should occur prior to Sulfapyridine administration due to the potential for falsely elevated results  Calcium 8 9  8 3 - 10 1 mg/dL Final    AST 28  5 - 45 U/L Final    Comment:   Specimen collection should occur prior to Sulfasalazine administration due to the potential for falsely depressed results  ALT 33  12 - 78 U/L Final    Comment:   Specimen collection should occur prior to Sulfasalazine administration due to the potential for falsely depressed results  Alkaline Phosphatase 118 (*) 46 - 116 U/L Final    Total Protein 7 7  6 4 - 8 2 g/dL Final    Albumin 4 2  3 5 - 5 0 g/dL Final    Total Bilirubin 0 40  0 20 - 1 00 mg/dL Final    eGFR 82  ml/min/1 73sq m Final    Narrative:     National Kidney Disease Education Program recommendations are as follows:  GFR calculation is accurate only with a steady state creatinine  Chronic Kidney disease less than 60 ml/min/1 73 sq  meters  Kidney failure less than 15 ml/min/1 73 sq  meters  MEDICAL ALCOHOL - Abnormal     Ethanol Lvl 292 (*) 0 - 3 mg/dL Final   ACETAMINOPHEN LEVEL - Abnormal     Acetaminophen Level <2 (*) 10 - 30 ug/mL Final   SALICYLATE LEVEL - Abnormal     Salicylate Lvl <3 (*) 3 - 20 mg/dL Final   RAPID DRUG SCREEN, URINE - Normal    Amph/Meth UR Negative  Negative Final    Barbiturate Ur Negative  Negative Final    Benzodiazepine Urine Negative  Negative Final    Cocaine Urine Negative  Negative Final    Methadone Urine Negative  Negative Final    Opiate Urine Negative  Negative Final    PCP Ur Negative  Negative Final    THC Urine Negative  Negative Final    Narrative:     FOR MEDICAL PURPOSES ONLY  IF CONFIRMATION NEEDED PLEASE CONTACT THE LAB WITHIN 5 DAYS      Drug Screen Cutoff Levels:  AMPHETAMINE/METHAMPHETAMINES  1000 ng/mL  BARBITURATES     200 ng/mL  BENZODIAZEPINES     200 ng/mL  COCAINE      300 ng/mL  METHADONE      300 ng/mL  OPIATES      300 ng/mL  PHENCYCLIDINE     25 ng/mL  THC       50 ng/mL   MAGNESIUM - Normal    Magnesium 2 2  1 6 - 2 6 mg/dL Final   PROLACTIN - Normal    Prolactin 27 7  ng/mL Final    Comment:   PROLACTIN:     Females:    Non-pregnant    2 2-30  3  ng/mL    Pregnant        8 1-347 6 ng/mL    Post-menopausal 0 7-31 5  ng/mL    POCT ALCOHOL BREATH TEST - Normal    EXTBreath Alcohol 0 157   Final   POCT ALCOHOL BREATH TEST - Normal    EXTBreath Alcohol 0 114   Final   POCT ALCOHOL BREATH TEST - Normal    EXTBreath Alcohol 0 098   Final                               ED Course as of Dec 29 1716   Fri Dec 29, 2017   1114 Accepted at [facility]  Family would like to drive her  Able to take her immediately        Procedures  MDM  CritCare Time      Disposition  Final diagnoses:   Seizure ([facility] ) - new-onset   Alcohol intoxication ([facility] [address] )   Polysubstance abuse   Suicidal ideation     Time reflects when diagnosis was documented in both MDM as applicable and the Disposition within this note     Time User Action Codes Description Comment    12/28/2017  3:32 PM Carl Cork Add [R56 9] Seizure ([facility] [address] )     12/28/2017  3:32 PM Carl Cork Modify [R56 9] Seizure ([facility] ) new-onset    12/28/2017  3:32 PM Carl Cork Add [F10 929] Alcohol intoxication ([facility] [address] )     12/28/2017  3:32 PM Carl Cork Add [F19 10] Polysubstance abuse     12/28/2017  3:33 PM Carl Cork Add [R23 254] Suicidal ideation       ED Disposition     ED Disposition Condition Comment    Transfer to Another [address] should be transferred out to [facility], accepted by       MD Documentation    [address] Most Recent Value   Patient Condition  The patient has been stabilized such that within reasonable medical probability, no material deterioration of the patient condition or the condition of the unborn child(cait) is likely to result from the transfer   Reason for Transfer  Level of Care needed not available at this facility [Inpatient Alcohol rehabilitation]   Benefits of Transfer  Specialized equipment and/or services available at the receiving facility (Include comment)________________________   Risks of Transfer  Potential for delay in receiving treatment, Potential deterioration of medical condition, Increased discomfort during transfer   Accepting Physician  [address] Name, [address]    (Name & Tel number)  Stangucci Reeves, Crisis   Transported by [employer] and Unit #)  POV   Sending MD Dr Rena Vieira   Provider Certification  The patient is stable for psychiatric transfer because they are medically stable, and is protected from harming him/herself or others during transport      RN Documentation    [address] Name, [address]    (Name & Tel number)  Eve Reeves, Crisis   Transported by [employer] and Unit #)  POV      Follow-up Information    None       Discharge Medication List as of 12/29/2017 12:08 PM      CONTINUE these medications which have NOT CHANGED    Details   ALPRAZolam (XANAX) 0 5 mg tablet Take 1 tablet by mouth 2 (two) times a day as needed, Starting Thu 9/3/2015, Historical Med      budesonide-formoterol (SYMBICORT) 160-4 5 mcg/act inhaler Inhale, Starting Thu 12/21/2017, Historical Med      Multiple Vitamins-Iron (QC DAILY MULTIVITAMINS/IRON) TABS Take 1 tablet by mouth daily, Starting Mon 1/28/2013, Historical Med      traMADol (ULTRAM) 50 mg tablet Take by mouth, Starting Tue 1/26/2016, Historical Med      venlafaxine (EFFEXOR-XR) 150 mg 24 hr capsule Take by mouth, Starting Wed 5/11/2016, Historical Med      albuterol (PROVENTIL HFA,VENTOLIN HFA) 90 mcg/act inhaler Inhale 2 puffs every 4 (four) hours as needed for wheezing, Starting Tue 6/13/2017, Print           No discharge procedures on file         ED Provider  Electronically Signed by

## 2017-12-29 NOTE — ED NOTES
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

## 2017-12-29 NOTE — ED NOTES
Patient repositioned in bed  Reports that headache is "better " Will continue to monitor        Ed ARUNA Dale  12/28/17 2819

## 2017-12-29 NOTE — ED NOTES
Family standing in front of patient's room  Family informed that we have a 2 visitor policy in ED  Additional family members asked to sit in the family room  Family appeared angered by this  Explained for patient privacy and to decrease stimuli, 2 visitors at a time are permitted on the unit        Louise Hahn RN  12/28/17 1910

## 2017-12-29 NOTE — ED NOTES
Pt awoke spontaneously, ambulated to bathroom to void with supervision, gait steady  Linen changed  Pt is aware of pending bed search for detox placement        Yue Solorzano RN  12/29/17 2312

## 2017-12-29 NOTE — ED NOTES
Nursing supervisor and this RN spoke with patient's live in friend regarding concerns of care and Inability to speak with crisis worker previously  Concerns and questions addressed  Visitor would like to file a formal complaints  Nursing supervisor addressing complaint  Visitor agrees to follow hospital policies  Will continue to monitor        Shannan Weiss RN  12/28/17 9791

## 2018-01-14 NOTE — PROGRESS NOTES
Assessment    1  Encounter for preventive health examination (V70 0) (Z00 00)   2  Multiple joint pain (719 49) (M25 50)   3  Overweight (278 02) (E66 3)   4  Anxiety (300 00) (F41 9)    Plan  PMH: History of obesity    · Phentermine HCl - 30 MG Oral Capsule   · Phentermine HCl - 37 5 MG Oral Capsule; TAKE 1 CAPSULE EVERY MORNING  BEFORE BREAKFAST  PMH: Shoulder region pain    · TraMADol HCl - 50 MG Oral Tablet; TAKE 1 OR 2 TABLETS BY MOUTH EVERY 4  HOURS AS NEEDED FOR PAIN    Discussion/Summary  health maintenance visit Currently, she eats a healthy diet and has an adequate exercise regimen  the patient declines cervical cancer screening Breast cancer screening: the patient declines breast cancer screening  Colorectal cancer screening: the patient declines colorectal cancer screening  Osteoporosis screening: the patient declines bone mineral density testing  The patient declines immunizations  Patient given refills on meds  Reminded has to be seen every 6 months for refills, especially since medications are controlled  Chief Complaint  pt here today for a yearly check up she needs med renewals Reviewed medication list with pt,  DD      History of Present Illness  HPI: 47year old white female presents for PE  Patient works with animals, 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  Needs refills on Tramadol, and Phentermine  Patient does not have good insurance has high co-pay  Recently had 2 knee surgeries and hand surgery  Review of Systems    Constitutional: no fever, no chills and not feeling tired  ENT: no earache, no sore throat and no nasal discharge  Cardiovascular: no chest pain and no palpitations  Respiratory: no shortness of breath and no cough  Gastrointestinal: no abdominal pain, no nausea and no vomiting  Musculoskeletal: arthralgias, but no myalgias  Neurological: no headache, no numbness, no tingling, no dizziness and no fainting     Psychiatric: not suicidal, no anxiety, no sleep disturbances and no depression  Over the past 2 weeks, how often have you been bothered by the following problems? 1 ) Little interest or pleasure in doing things? Not at all    2 ) Feeling down, depressed or hopeless? Not at all    3 ) Trouble falling asleep or sleeping too much? Not at all    4 ) Feeling tired or having little energy? Not at all    5 ) Poor appetite or overeating? Not at all    6 ) Feeling bad about yourself, or that you are a failure, or have let yourself or your family down? Not at all    7 ) Trouble concentrating on things, such as reading a newspaper or watching television? Not at all    8 ) Moving or speaking so slowly that other people could have noticed, or the opposite, moving or speaking faster than usual? Not at all    9 ) Thoughts that you would be better off dead or of hurting yourself in some way? Not at all  Score 0      Active Problems    1  Acne (706 1) (L70 9)   2  Anxiety (300 00) (F41 9)   3   Multiple joint pain (719 49) (M25 50)    Past Medical History    · History of Generalized anxiety disorder (300 02) (F41 1)   · History of  6   · History of insomnia (V13 89) (B84 278)   · History of ischemic heart disease (V12 59) (Z86 79)   · History of spontaneous  (V13 29) (Z87 59)   · History of Knee joint pain (719 46) (M25 569)   · History of Myalgia (729 1) (M79 1)   · History of Screening for breast cancer (V76 10) (Z12 39)   · History of Willing to be an organ donor (V70 8) (Z00 5)    Surgical History    · History of Knee Surgery    Family History  Mother    · Family history of Alzheimer Disease   · Family history of Atrial Fibrillation  Father    · Family history of Coronary Artery Disease (V17 49)   · Family history of hypertension (V17 49) (Z82 49)  Daughter    · Family history of osteogenesis imperfecta (V17 89) (Z82 69)    Social History    · Always uses seat belt   · Being A Social Drinker   · Caffeine use (V49 89) (F15 90)   · Care provider for parents (V61 49) (Z63 6) · Denied: History of Current Smoker   · quit more than 10 years ago  ·    · Guns in the Home: Stored in locked cabinet   · No drug use   · Racial background   · white    Current Meds   1  Multivitamins TABS; TAKE 1 TABLET DAILY; Therapy: 24AHH1787 to (Evaluate:16Oad5110) Recorded   2  Phentermine HCl - 30 MG Oral Capsule; take one capsule by mouth daily; Therapy: 74QIX1423 to (Evaluate:28Jzv5252)  Requested for: 00QYF6631; Last   Rx:00Vxc0559; Status: ACTIVE - Renewal Denied Ordered   3  TraMADol HCl - 50 MG Oral Tablet; TAKE 1 OR 2 TABLETS BY MOUTH EVERY 4 HOURS   AS NEEDED FOR PAIN;   Therapy: 51DDS1749 to (Evaluate:78Nye4016)  Requested for: 53QZP3403; Last   LX:54QAT2601 Ordered   4  Venlafaxine HCl ER 75 MG Oral Capsule Extended Release 24 Hour; take one capsule   by mouth daily; Therapy: 95JLN9210 to (Evaluate:98Qbg5163)  Requested for: 47Ndd1657; Last   Rx:01Jqz6785 Ordered    Allergies    1  No Known Drug Allergies    Vitals   Recorded: 50QOB6189 03:44PM   Heart Rate 64, L Radial   Pulse Quality Normal, L Radial   Systolic 206, LUE, Sitting   Diastolic 58, LUE, Sitting   Height 5 ft 9 in   Weight 185 lb 0 5 oz   BMI Calculated 27 32   BSA Calculated 2     Physical Exam    Constitutional   General appearance: No acute distress, well appearing and well nourished  Head and Face   Head and face: Normal     Palpation of the face and sinuses: No sinus tenderness  Eyes   Conjunctiva and lids: No swelling, erythema or discharge  Pupils and irises: Equal, round, reactive to light  Ears, Nose, Mouth, and Throat   External inspection of ears and nose: Normal     Otoscopic examination: Tympanic membranes translucent with normal light reflex  Canals patent without erythema  Hearing: Normal     Nasal mucosa, septum, and turbinates: Normal without edema or erythema  Lips, teeth, and gums: Normal, good dentition  Oropharynx: Normal with no erythema, edema, exudate or lesions      Neck Neck: Supple, symmetric, trachea midline, no masses  Pulmonary   Respiratory effort: No increased work of breathing or signs of respiratory distress  Auscultation of lungs: Clear to auscultation  Cardiovascular   Auscultation of heart: Normal rate and rhythm, normal S1 and S2, no murmurs  Pedal pulses: 2+ bilaterally  Abdomen   Abdomen: Non-tender, no masses  Liver and spleen: No hepatomegaly or splenomegaly  Musculoskeletal   Gait and station: Normal     Digits and nails: Normal without clubbing or cyanosis  Joints, bones, and muscles: Normal     Range of motion: Normal     Stability: Normal     Muscle strength/tone: Normal     Psychiatric   Judgment and insight: Normal     Orientation to person, place, and time: Normal     Recent and remote memory: Intact      Mood and affect: Normal        Signatures   Electronically signed by : Maribell Jones, [facility]; May 24 2017  4:10PM EST                       (Author)    Electronically signed by : DEVIKA Lemus ; May 25 2017  9:31AM EST

## 2018-01-22 VITALS
SYSTOLIC BLOOD PRESSURE: 110 MMHG | BODY MASS INDEX: 27.41 KG/M2 | HEIGHT: 69 IN | WEIGHT: 185.03 LBS | DIASTOLIC BLOOD PRESSURE: 58 MMHG | HEART RATE: 64 BPM

## 2018-01-24 VITALS
HEIGHT: 69 IN | HEART RATE: 74 BPM | WEIGHT: 197.31 LBS | TEMPERATURE: 97.6 F | BODY MASS INDEX: 29.22 KG/M2 | DIASTOLIC BLOOD PRESSURE: 82 MMHG | SYSTOLIC BLOOD PRESSURE: 130 MMHG | OXYGEN SATURATION: 98 %

## 2018-02-26 DIAGNOSIS — F41.9 ANXIETY: Primary | ICD-10-CM

## 2018-02-26 DIAGNOSIS — G89.29 OTHER CHRONIC PAIN: ICD-10-CM

## 2018-02-26 DIAGNOSIS — E66.9 OBESITY, UNSPECIFIED CLASSIFICATION, UNSPECIFIED OBESITY TYPE, UNSPECIFIED WHETHER SERIOUS COMORBIDITY PRESENT: ICD-10-CM

## 2018-02-26 RX ORDER — PHENTERMINE HYDROCHLORIDE 37.5 MG/1
TABLET ORAL
Qty: 30 TABLET | Refills: 3 | Status: SHIPPED | OUTPATIENT
Start: 2018-02-26 | End: 2018-05-01 | Stop reason: SDUPTHER

## 2018-02-26 RX ORDER — VENLAFAXINE HYDROCHLORIDE 150 MG/1
CAPSULE, EXTENDED RELEASE ORAL
Qty: 30 CAPSULE | Refills: 3 | Status: SHIPPED | OUTPATIENT
Start: 2018-02-26 | End: 2018-05-01 | Stop reason: SDUPTHER

## 2018-02-26 RX ORDER — TRAMADOL HYDROCHLORIDE 50 MG/1
TABLET ORAL
Qty: 160 TABLET | Refills: 3 | Status: SHIPPED | OUTPATIENT
Start: 2018-02-26 | End: 2018-05-01 | Stop reason: SDUPTHER

## 2018-02-26 RX ORDER — ALPRAZOLAM 0.5 MG/1
TABLET ORAL
Qty: 60 TABLET | Refills: 2 | Status: SHIPPED | OUTPATIENT
Start: 2018-02-26 | End: 2018-05-01 | Stop reason: SDUPTHER

## 2018-04-30 ENCOUNTER — TRANSCRIBE ORDERS (OUTPATIENT)
Dept: FAMILY MEDICINE CLINIC | Facility: HOSPITAL | Age: 53
End: 2018-04-30

## 2018-04-30 DIAGNOSIS — S89.92XD LEFT KNEE INJURY, SUBSEQUENT ENCOUNTER: Primary | ICD-10-CM

## 2018-04-30 DIAGNOSIS — F41.9 ANXIETY: ICD-10-CM

## 2018-04-30 DIAGNOSIS — Z12.11 COLON CANCER SCREENING: Primary | ICD-10-CM

## 2018-04-30 RX ORDER — VENLAFAXINE HYDROCHLORIDE 150 MG/1
150 CAPSULE, EXTENDED RELEASE ORAL EVERY MORNING
Qty: 30 CAPSULE | Refills: 0 | Status: CANCELLED | OUTPATIENT
Start: 2018-04-30

## 2018-05-01 ENCOUNTER — OFFICE VISIT (OUTPATIENT)
Dept: FAMILY MEDICINE CLINIC | Facility: HOSPITAL | Age: 53
End: 2018-05-01
Payer: COMMERCIAL

## 2018-05-01 VITALS
DIASTOLIC BLOOD PRESSURE: 84 MMHG | BODY MASS INDEX: 30.61 KG/M2 | HEIGHT: 67 IN | SYSTOLIC BLOOD PRESSURE: 128 MMHG | WEIGHT: 195 LBS | HEART RATE: 71 BPM

## 2018-05-01 DIAGNOSIS — G89.29 OTHER CHRONIC PAIN: ICD-10-CM

## 2018-05-01 DIAGNOSIS — E66.9 OBESITY, UNSPECIFIED CLASSIFICATION, UNSPECIFIED OBESITY TYPE, UNSPECIFIED WHETHER SERIOUS COMORBIDITY PRESENT: ICD-10-CM

## 2018-05-01 DIAGNOSIS — Z87.898 HISTORY OF ALCOHOL USE: ICD-10-CM

## 2018-05-01 DIAGNOSIS — F41.9 ANXIETY: ICD-10-CM

## 2018-05-01 DIAGNOSIS — G62.9 NEUROPATHY: Primary | ICD-10-CM

## 2018-05-01 PROBLEM — R06.02 SOB (SHORTNESS OF BREATH): Status: ACTIVE | Noted: 2017-06-15

## 2018-05-01 PROBLEM — E66.3 OVERWEIGHT: Status: ACTIVE | Noted: 2017-05-24

## 2018-05-01 PROBLEM — J45.40 MODERATE PERSISTENT ASTHMA: Status: ACTIVE | Noted: 2017-07-05

## 2018-05-01 PROBLEM — F41.1 GENERALIZED ANXIETY DISORDER: Status: ACTIVE | Noted: 2017-12-21

## 2018-05-01 LAB
EST. AVERAGE GLUCOSE BLD GHB EST-MCNC: 117 MG/DL
HBA1C MFR BLD: 5.7 % (ref 4.8–5.6)

## 2018-05-01 PROCEDURE — 99214 OFFICE O/P EST MOD 30 MIN: CPT | Performed by: PHYSICIAN ASSISTANT

## 2018-05-01 RX ORDER — TRAMADOL HYDROCHLORIDE 50 MG/1
50-100 TABLET ORAL EVERY 6 HOURS PRN
Qty: 160 TABLET | Refills: 1 | Status: SHIPPED | OUTPATIENT
Start: 2018-05-01 | End: 2018-05-29 | Stop reason: SDUPTHER

## 2018-05-01 RX ORDER — GABAPENTIN 100 MG/1
100 CAPSULE ORAL 3 TIMES DAILY
Qty: 90 CAPSULE | Refills: 3 | Status: SHIPPED | OUTPATIENT
Start: 2018-05-01 | End: 2018-07-31 | Stop reason: SDUPTHER

## 2018-05-01 RX ORDER — VENLAFAXINE HYDROCHLORIDE 75 MG/1
150 CAPSULE, EXTENDED RELEASE ORAL EVERY MORNING
Qty: 60 CAPSULE | Refills: 3 | Status: SHIPPED | OUTPATIENT
Start: 2018-05-01 | End: 2019-05-28 | Stop reason: SDUPTHER

## 2018-05-01 RX ORDER — PHENTERMINE HYDROCHLORIDE 37.5 MG/1
37.5 TABLET ORAL
Qty: 30 TABLET | Refills: 3 | Status: SHIPPED | OUTPATIENT
Start: 2018-05-01 | End: 2018-08-21 | Stop reason: SDUPTHER

## 2018-05-01 NOTE — PATIENT INSTRUCTIONS
Discussed and reviewed all medications,  Re-start Gabapentin, warned about medication, has to start slowly with 100 mg  Day one and slowly increasing to tid  Also refilled Tramadol, and Phentermine  Recommend vitamin B complex, folic acid, and Thiamine due to hx  Of alcohol abuse  Discussed patient's life plans, and recommend applying for medical assistance through state insurance

## 2018-05-01 NOTE — PROGRESS NOTES
Assessment/Plan:         Diagnoses and all orders for this visit:    Neuropathy  -     gabapentin (NEURONTIN) 100 mg capsule; Take 1 capsule (100 mg total) by mouth 3 (three) times a day Start with one tab  Day one, then one tab  Twice a day day 2, then one tab  Tid   -     Comprehensive metabolic panel; Future  -     Lipid panel; Future  -     TSH, 3rd generation with T4 reflex; Future  -     Vitamin B12; Future  -     Comprehensive metabolic panel   -      HEMOGLOBIN A1C W/ EAG ESTIMATION; Future      Anxiety  -     venlafaxine (EFFEXOR-XR) 75 mg 24 hr capsule; Take 2 capsules (150 mg total) by mouth every morning Take 2 tab  In the morning then one tab alternating with 2 tabs  In the morning   -     CBC and differential; Future  -     TSH, 3rd generation with T4 reflex; Future      Other chronic pain  -     traMADol (ULTRAM) 50 mg tablet; Take 1-2 tablets ( mg total) by mouth every 6 (six) hours as needed for moderate pain every 4 to 6 hours PRN for pain  -     CBC and differential; Future  -     Comprehensive metabolic panel; Future  -     Lipid panel; Future  -     TSH, 3rd generation with T4 reflex; Future  -     Vitamin B12; Future  -     CBC and differential  -     HEMOGLOBIN A1C W/ EAG ESTIMATION; Future      Obesity, unspecified classification, unspecified obesity type, unspecified whether serious comorbidity present  -     phentermine (ADIPEX-P) 37 5 MG tablet; Take 1 tablet (37 5 mg total) by mouth daily before breakfast  -     CBC and differential; Future  -     Comprehensive metabolic panel; Future  -     Lipid panel; Future  -     TSH, 3rd generation with T4 reflex; Future  -     Vitamin B12; Future  -     CBC and differential  -     HEMOGLOBIN A1C W/ EAG ESTIMATION; Future    Subjective:      Patient ID: Prashant August is a 47 y o  female  47year old white female presents with c/o lower back pain radiating down right leg  Also right arm/hand pain     About to lose insurance wants to slowly come off Effexor  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, "[employer]", 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, has RV, trying to clear out present home, due to losing job;   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, 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;   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, was blamed patient  for bringing on too many rescue animals  Was very stressed  Not a smoker, used to, stopped 16 years ago  Fluids- 3 caffeine drinks a day, and drinks 5 glasses of water a day, stopped coffee (1/2 pot a day), about 9 months ago  December was drinking alcohol, due to recent news of being fired, was on 3 week binge, ended up in ER in December  Stopped alcohol use after 3 weeks, was very depressed until visiting friend in [place] past  March  Now has plans to travel, has parent's home in [place];   has daughter in [place]  Plans to live part of year in [place]; New outlook on life  Back Pain   Pertinent negatives include no abdominal pain, fever or weakness  Review of Systems   Constitutional: Positive for diaphoresis and fatigue  Negative for chills and fever  Respiratory: Negative for cough and shortness of breath  Gastrointestinal: Negative for abdominal pain, constipation, diarrhea, nausea and vomiting  Musculoskeletal: Positive for back pain, joint swelling, myalgias, neck pain and neck stiffness  Neurological: Negative for tremors and weakness  Psychiatric/Behavioral: Positive for sleep disturbance  Negative for agitation, self-injury and suicidal ideas  The patient is nervous/anxious  Objective:      /84   Pulse 71   Ht 5' 6 5" (1 689 m)   Wt 88 5 kg (195 lb)   BMI 31 00 kg/m²          Physical Exam   Constitutional: She is oriented to person, place, and time  She appears well-developed and well-nourished  No distress  HENT:   Head: Normocephalic and atraumatic  Eyes: Conjunctivae and EOM are normal  Right eye exhibits no discharge   Left eye exhibits no discharge  No scleral icterus  Cardiovascular: Normal rate, regular rhythm and normal heart sounds  Pulmonary/Chest: Effort normal and breath sounds normal  No respiratory distress  She has no wheezes  She has no rales  Musculoskeletal: Normal range of motion  She exhibits edema and tenderness  She exhibits no deformity  Hands and feet swollen, FROM of all ext  Neurological: She is alert and oriented to person, place, and time  Skin: She is not diaphoretic  Psychiatric: She has a normal mood and affect  Her behavior is normal  Judgment and thought content normal    Nursing note and vitals reviewed

## 2018-05-02 LAB
ALBUMIN SERPL-MCNC: 4.2 G/DL (ref 3.5–5.5)
ALBUMIN/GLOB SERPL: 1.4 {RATIO} (ref 1.2–2.2)
ALP SERPL-CCNC: 59 IU/L (ref 39–117)
ALT SERPL-CCNC: 31 IU/L (ref 0–32)
AST SERPL-CCNC: 35 IU/L (ref 0–40)
BASOPHILS # BLD AUTO: 0.1 X10E3/UL (ref 0–0.2)
BASOPHILS NFR BLD AUTO: 1 %
BILIRUB SERPL-MCNC: 0.2 MG/DL (ref 0–1.2)
BUN SERPL-MCNC: 11 MG/DL (ref 6–24)
BUN/CREAT SERPL: 12 (ref 9–23)
CALCIUM SERPL-MCNC: 9.3 MG/DL (ref 8.7–10.2)
CHLORIDE SERPL-SCNC: 102 MMOL/L (ref 96–106)
CHOLEST SERPL-MCNC: 145 MG/DL (ref 100–199)
CHOLEST/HDLC SERPL: 2.5 RATIO (ref 0–4.4)
CO2 SERPL-SCNC: 24 MMOL/L (ref 18–29)
CREAT SERPL-MCNC: 0.9 MG/DL (ref 0.57–1)
EOSINOPHIL # BLD AUTO: 0.2 X10E3/UL (ref 0–0.4)
EOSINOPHIL NFR BLD AUTO: 3 %
ERYTHROCYTE [DISTWIDTH] IN BLOOD BY AUTOMATED COUNT: 15.9 % (ref 12.3–15.4)
GLOBULIN SER-MCNC: 2.9 G/DL (ref 1.5–4.5)
GLUCOSE SERPL-MCNC: 107 MG/DL (ref 65–99)
HCT VFR BLD AUTO: 36.6 % (ref 34–46.6)
HDLC SERPL-MCNC: 59 MG/DL
HGB BLD-MCNC: 12 G/DL (ref 11.1–15.9)
IMM GRANULOCYTES # BLD: 0 X10E3/UL (ref 0–0.1)
IMM GRANULOCYTES NFR BLD: 0 %
LDLC SERPL CALC-MCNC: 67 MG/DL (ref 0–99)
LYMPHOCYTES # BLD AUTO: 1.7 X10E3/UL (ref 0.7–3.1)
LYMPHOCYTES NFR BLD AUTO: 28 %
MCH RBC QN AUTO: 27.4 PG (ref 26.6–33)
MCHC RBC AUTO-ENTMCNC: 32.8 G/DL (ref 31.5–35.7)
MCV RBC AUTO: 84 FL (ref 79–97)
MONOCYTES # BLD AUTO: 0.4 X10E3/UL (ref 0.1–0.9)
MONOCYTES NFR BLD AUTO: 6 %
NEUTROPHILS # BLD AUTO: 3.7 X10E3/UL (ref 1.4–7)
NEUTROPHILS NFR BLD AUTO: 62 %
PLATELET # BLD AUTO: 275 X10E3/UL (ref 150–379)
POTASSIUM SERPL-SCNC: 4.5 MMOL/L (ref 3.5–5.2)
PROT SERPL-MCNC: 7.1 G/DL (ref 6–8.5)
RBC # BLD AUTO: 4.38 X10E6/UL (ref 3.77–5.28)
SL AMB EGFR AFRICAN AMERICAN: 85 ML/MIN/1.73
SL AMB EGFR NON AFRICAN AMERICAN: 74 ML/MIN/1.73
SL AMB VLDL CHOLESTEROL CALC: 19 MG/DL (ref 5–40)
SODIUM SERPL-SCNC: 140 MMOL/L (ref 134–144)
TRIGL SERPL-MCNC: 93 MG/DL (ref 0–149)
TSH SERPL DL<=0.005 MIU/L-ACNC: 0.79 UIU/ML (ref 0.45–4.5)
VIT B12 SERPL-MCNC: 663 PG/ML (ref 232–1245)
WBC # BLD AUTO: 6.1 X10E3/UL (ref 3.4–10.8)

## 2018-05-29 ENCOUNTER — OFFICE VISIT (OUTPATIENT)
Dept: FAMILY MEDICINE CLINIC | Facility: HOSPITAL | Age: 53
End: 2018-05-29
Payer: COMMERCIAL

## 2018-05-29 VITALS
WEIGHT: 190 LBS | DIASTOLIC BLOOD PRESSURE: 76 MMHG | SYSTOLIC BLOOD PRESSURE: 128 MMHG | HEART RATE: 78 BPM | BODY MASS INDEX: 29.82 KG/M2 | HEIGHT: 67 IN

## 2018-05-29 DIAGNOSIS — M79.671 RIGHT FOOT PAIN: Primary | ICD-10-CM

## 2018-05-29 DIAGNOSIS — G89.29 OTHER CHRONIC PAIN: ICD-10-CM

## 2018-05-29 DIAGNOSIS — L30.9 DERMATITIS: ICD-10-CM

## 2018-05-29 DIAGNOSIS — L30.9 DERMATITIS: Primary | ICD-10-CM

## 2018-05-29 DIAGNOSIS — M79.89 FOOT SWELLING: ICD-10-CM

## 2018-05-29 PROCEDURE — 99214 OFFICE O/P EST MOD 30 MIN: CPT | Performed by: PHYSICIAN ASSISTANT

## 2018-05-29 PROCEDURE — 3008F BODY MASS INDEX DOCD: CPT | Performed by: PHYSICIAN ASSISTANT

## 2018-05-29 PROCEDURE — 1036F TOBACCO NON-USER: CPT | Performed by: PHYSICIAN ASSISTANT

## 2018-05-29 RX ORDER — CARBINOXAMINE MALEATE 6 MG/1
TABLET ORAL
Qty: 120 TABLET | Refills: 3 | Status: SHIPPED | OUTPATIENT
Start: 2018-05-29 | End: 2019-05-28 | Stop reason: ALTCHOICE

## 2018-05-29 RX ORDER — PREDNISONE 10 MG/1
10 TABLET ORAL DAILY
Qty: 14 TABLET | Refills: 3 | Status: SHIPPED | OUTPATIENT
Start: 2018-05-29 | End: 2018-05-29 | Stop reason: SDUPTHER

## 2018-05-29 RX ORDER — TRAMADOL HYDROCHLORIDE 50 MG/1
50-100 TABLET ORAL EVERY 6 HOURS PRN
Qty: 160 TABLET | Refills: 0 | Status: SHIPPED | OUTPATIENT
Start: 2018-05-29 | End: 2018-07-05 | Stop reason: SDUPTHER

## 2018-05-29 RX ORDER — PREDNISONE 10 MG/1
10 TABLET ORAL DAILY
Qty: 14 TABLET | Refills: 0 | Status: SHIPPED | OUTPATIENT
Start: 2018-05-29 | End: 2019-05-28 | Stop reason: ALTCHOICE

## 2018-05-29 NOTE — PROGRESS NOTES
Assessment/Plan:         Diagnoses and all orders for this visit:    Right foot pain  -     Discontinue: predniSONE 10 mg tablet; Take 1 tablet (10 mg total) by mouth daily Take 3 for 2 days, then 2 for 2 days, then one daily until completed  -     predniSONE 10 mg tablet; Take 1 tablet (10 mg total) by mouth daily Take 3 for 2 days, then 2 for 2 days, then one daily until completed  Other chronic pain  -     traMADol (ULTRAM) 50 mg tablet; Take 1-2 tablets ( mg total) by mouth every 6 (six) hours as needed for moderate pain every 4 to 6 hours PRN for pain  -     Discontinue: predniSONE 10 mg tablet; Take 1 tablet (10 mg total) by mouth daily Take 3 for 2 days, then 2 for 2 days, then one daily until completed  -     predniSONE 10 mg tablet; Take 1 tablet (10 mg total) by mouth daily Take 3 for 2 days, then 2 for 2 days, then one daily until completed  Foot swelling  -     XR foot 3+ vw right; Future  -     Discontinue: predniSONE 10 mg tablet; Take 1 tablet (10 mg total) by mouth daily Take 3 for 2 days, then 2 for 2 days, then one daily until completed  -     predniSONE 10 mg tablet; Take 1 tablet (10 mg total) by mouth daily Take 3 for 2 days, then 2 for 2 days, then one daily until completed  Dermatitis        Subjective:      Patient ID: Houston Abbasi is a 47 y o  female  47year old white female presents with c/o rash all over, pruritic past 4-5 days  Was in the marches,  Walking around brush recently  Having right foot pains, pain getting worse, past few years intermittently;   had neuroma removed 10 years ago  Took benadryl, and using hydrocortisone spray  Took Ibuprofen for pain, right foot  Patient losing insurance end of this month, and worried about some chronic health issues  Poison Ivy   Pertinent negatives include no cough, fatigue, fever, shortness of breath or vomiting           Review of Systems   Constitutional: Negative for chills, diaphoresis, fatigue and fever  Respiratory: Negative for cough and shortness of breath  Gastrointestinal: Negative for abdominal pain, nausea and vomiting  Musculoskeletal: Positive for arthralgias and joint swelling  Negative for neck pain and neck stiffness  Objective:      /76   Pulse 78   Ht 5' 6 5" (1 689 m)   Wt 86 2 kg (190 lb)   BMI 30 21 kg/m²          Physical Exam   Constitutional: She is oriented to person, place, and time  She appears well-developed and well-nourished  No distress  Cardiovascular: Normal rate, regular rhythm and normal heart sounds  Pulmonary/Chest: Effort normal and breath sounds normal  No respiratory distress  She has no wheezes  She has no rales  Neurological: She is alert and oriented to person, place, and time  Skin: Rash noted  She is not diaphoretic  There is erythema  Right foot erythematous and inflamed, left foot somewhat inflamed  FROM of all ext  Skin-  Erythematous, dispersed papular rash noted on legs and arms  Psychiatric: She has a normal mood and affect  Her behavior is normal  Judgment and thought content normal    Nursing note and vitals reviewed

## 2018-05-29 NOTE — PATIENT INSTRUCTIONS
Patient to try short term Prednisone  Sent to get X-ray of right foot  Can try otc Motrin for pain, prn  Can try cool oatmeal baths for relief of rash

## 2018-06-19 ENCOUNTER — TELEPHONE (OUTPATIENT)
Dept: FAMILY MEDICINE CLINIC | Facility: HOSPITAL | Age: 53
End: 2018-06-19

## 2018-06-19 NOTE — TELEPHONE ENCOUNTER
Please inform patient that we have a record of all her medication refills, and she is not due to get a refill until early July

## 2018-06-19 NOTE — TELEPHONE ENCOUNTER
I will only give her a few Tylenol #3, if she is not allergic, I will give her #30 pills;   or she can follow up with dentist?

## 2018-06-19 NOTE — TELEPHONE ENCOUNTER
PT called - said that she will take Tylenol # 3 - wants it sent to [facility] - how do you want her to take it?

## 2018-07-05 DIAGNOSIS — G89.29 OTHER CHRONIC PAIN: ICD-10-CM

## 2018-07-05 RX ORDER — TRAMADOL HYDROCHLORIDE 50 MG/1
TABLET ORAL
Qty: 160 TABLET | Refills: 3 | Status: SHIPPED | OUTPATIENT
Start: 2018-07-05 | End: 2018-11-07 | Stop reason: SDUPTHER

## 2018-07-31 DIAGNOSIS — G62.9 NEUROPATHY: ICD-10-CM

## 2018-07-31 RX ORDER — GABAPENTIN 100 MG/1
CAPSULE ORAL
Qty: 90 CAPSULE | Refills: 3 | Status: SHIPPED | OUTPATIENT
Start: 2018-07-31 | End: 2020-07-30 | Stop reason: ALTCHOICE

## 2018-08-21 DIAGNOSIS — F41.9 ANXIETY: Primary | ICD-10-CM

## 2018-08-21 DIAGNOSIS — E66.9 OBESITY, UNSPECIFIED CLASSIFICATION, UNSPECIFIED OBESITY TYPE, UNSPECIFIED WHETHER SERIOUS COMORBIDITY PRESENT: ICD-10-CM

## 2018-08-22 RX ORDER — PHENTERMINE HYDROCHLORIDE 37.5 MG/1
TABLET ORAL
Qty: 30 TABLET | Refills: 3 | Status: SHIPPED | OUTPATIENT
Start: 2018-08-22 | End: 2018-12-10 | Stop reason: SDUPTHER

## 2018-08-22 RX ORDER — ALPRAZOLAM 0.5 MG/1
TABLET ORAL
Qty: 60 TABLET | Refills: 3 | Status: SHIPPED | OUTPATIENT
Start: 2018-08-22 | End: 2018-11-07 | Stop reason: SDUPTHER

## 2018-10-05 DIAGNOSIS — G89.29 OTHER CHRONIC PAIN: ICD-10-CM

## 2018-10-07 RX ORDER — TRAMADOL HYDROCHLORIDE 50 MG/1
TABLET ORAL
Qty: 160 TABLET | OUTPATIENT
Start: 2018-10-07

## 2018-11-07 DIAGNOSIS — F41.9 ANXIETY: ICD-10-CM

## 2018-11-07 DIAGNOSIS — G89.29 OTHER CHRONIC PAIN: ICD-10-CM

## 2018-11-07 RX ORDER — TRAMADOL HYDROCHLORIDE 50 MG/1
50-100 TABLET ORAL EVERY 6 HOURS PRN
Qty: 160 TABLET | Refills: 0 | OUTPATIENT
Start: 2018-11-07 | End: 2018-12-10 | Stop reason: SDUPTHER

## 2018-11-07 RX ORDER — ALPRAZOLAM 0.5 MG/1
0.5 TABLET ORAL 2 TIMES DAILY PRN
Qty: 60 TABLET | Refills: 0 | OUTPATIENT
Start: 2018-11-07 | End: 2019-01-14 | Stop reason: SDUPTHER

## 2018-11-19 DIAGNOSIS — E66.9 OBESITY, UNSPECIFIED CLASSIFICATION, UNSPECIFIED OBESITY TYPE, UNSPECIFIED WHETHER SERIOUS COMORBIDITY PRESENT: ICD-10-CM

## 2018-11-19 DIAGNOSIS — G89.29 OTHER CHRONIC PAIN: ICD-10-CM

## 2018-11-19 RX ORDER — TRAMADOL HYDROCHLORIDE 50 MG/1
50-100 TABLET ORAL EVERY 6 HOURS PRN
Qty: 160 TABLET | Refills: 0 | OUTPATIENT
Start: 2018-11-19

## 2018-11-19 RX ORDER — PHENTERMINE HYDROCHLORIDE 37.5 MG/1
37.5 TABLET ORAL EVERY MORNING
Qty: 30 TABLET | Refills: 0 | OUTPATIENT
Start: 2018-11-19

## 2018-11-27 ENCOUNTER — TELEPHONE (OUTPATIENT)
Dept: FAMILY MEDICINE CLINIC | Facility: HOSPITAL | Age: 53
End: 2018-11-27

## 2018-11-27 NOTE — TELEPHONE ENCOUNTER
Pt called  Pt was stopped in August and got a DUI  She needs a letter stating the medication she is on and why  She wanted an appt with you to tell you exactly what to write in the letter  She has no insurance and felt like she should not have to pay since she just needed a letter  I did not schedule an appt with you  She said the medication that you gave her for her poison oak caused it  I told her the last time she was seen in our office was 5/29/18, not August   She adamantly disagreed with me  She said she had the incident two days after you prescribed medication for her poison oak in August    She would like you to call her  Her court date is 12/4 and needs this letter ASAP

## 2018-11-28 NOTE — TELEPHONE ENCOUNTER
Please type up letter for patient, I will give you rough draft, told her it would be ready by Friday pm   Thanks, Neptali Staples

## 2018-12-10 DIAGNOSIS — G89.29 OTHER CHRONIC PAIN: ICD-10-CM

## 2018-12-10 DIAGNOSIS — E66.9 OBESITY, UNSPECIFIED CLASSIFICATION, UNSPECIFIED OBESITY TYPE, UNSPECIFIED WHETHER SERIOUS COMORBIDITY PRESENT: ICD-10-CM

## 2018-12-12 RX ORDER — TRAMADOL HYDROCHLORIDE 50 MG/1
50-100 TABLET ORAL EVERY 6 HOURS PRN
Qty: 160 TABLET | Refills: 0 | OUTPATIENT
Start: 2018-12-12 | End: 2019-01-14 | Stop reason: SDUPTHER

## 2018-12-12 RX ORDER — PHENTERMINE HYDROCHLORIDE 37.5 MG/1
37.5 TABLET ORAL EVERY MORNING
Qty: 30 TABLET | Refills: 0 | OUTPATIENT
Start: 2018-12-12 | End: 2019-01-14 | Stop reason: SDUPTHER

## 2019-01-14 DIAGNOSIS — F41.9 ANXIETY: ICD-10-CM

## 2019-01-14 DIAGNOSIS — G89.29 OTHER CHRONIC PAIN: ICD-10-CM

## 2019-01-14 DIAGNOSIS — E66.9 OBESITY, UNSPECIFIED CLASSIFICATION, UNSPECIFIED OBESITY TYPE, UNSPECIFIED WHETHER SERIOUS COMORBIDITY PRESENT: ICD-10-CM

## 2019-01-14 RX ORDER — PHENTERMINE HYDROCHLORIDE 37.5 MG/1
37.5 TABLET ORAL EVERY MORNING
Qty: 30 TABLET | Refills: 0 | OUTPATIENT
Start: 2019-01-14 | End: 2019-05-10 | Stop reason: SDUPTHER

## 2019-01-14 RX ORDER — TRAMADOL HYDROCHLORIDE 50 MG/1
50-100 TABLET ORAL EVERY 6 HOURS PRN
Qty: 160 TABLET | Refills: 0 | OUTPATIENT
Start: 2019-01-14 | End: 2019-04-09 | Stop reason: SDUPTHER

## 2019-01-14 RX ORDER — ALPRAZOLAM 0.5 MG/1
0.5 TABLET ORAL 2 TIMES DAILY PRN
Qty: 60 TABLET | Refills: 0 | OUTPATIENT
Start: 2019-01-14 | End: 2019-07-22 | Stop reason: SDUPTHER

## 2019-01-16 DIAGNOSIS — F41.9 ANXIETY: ICD-10-CM

## 2019-01-16 RX ORDER — ALPRAZOLAM 0.5 MG/1
TABLET ORAL
Qty: 60 TABLET | Refills: 3 | OUTPATIENT
Start: 2019-01-16

## 2019-03-15 DIAGNOSIS — S22.49XA CLOSED FRACTURE OF MULTIPLE RIBS, UNSPECIFIED LATERALITY, INITIAL ENCOUNTER: Primary | ICD-10-CM

## 2019-03-15 RX ORDER — OXYCODONE HYDROCHLORIDE AND ACETAMINOPHEN 5; 325 MG/1; MG/1
1 TABLET ORAL EVERY 4 HOURS PRN
Qty: 18 TABLET | Refills: 0 | Status: SHIPPED | OUTPATIENT
Start: 2019-03-15 | End: 2019-05-28 | Stop reason: ALTCHOICE

## 2019-03-15 NOTE — TELEPHONE ENCOUNTER
I will send 3 days worth of pain meds  ,   Please inform patient she MUST BE SEEN IN our office by MD for more meds  Or referral to pain management  We do not encourage buying drugs on the street

## 2019-03-19 ENCOUNTER — TELEPHONE (OUTPATIENT)
Dept: FAMILY MEDICINE CLINIC | Facility: HOSPITAL | Age: 54
End: 2019-03-19

## 2019-03-19 NOTE — TELEPHONE ENCOUNTER
Per previous phone message, Tamara Tabitha states pt needs ov  No more pain meds will be called in  I offered pt appt and she states she can not afford it at this time  I advised heating pad/tylenol   She accepted appt for tomorrow and then said never mind she will get pain meds elsewhere and hung up!!

## 2019-04-09 DIAGNOSIS — G89.29 OTHER CHRONIC PAIN: ICD-10-CM

## 2019-04-09 RX ORDER — TRAMADOL HYDROCHLORIDE 50 MG/1
50-100 TABLET ORAL EVERY 6 HOURS PRN
Qty: 160 TABLET | Refills: 0 | Status: SHIPPED | OUTPATIENT
Start: 2019-04-09 | End: 2019-05-10 | Stop reason: SDUPTHER

## 2019-05-10 DIAGNOSIS — G89.29 OTHER CHRONIC PAIN: ICD-10-CM

## 2019-05-10 DIAGNOSIS — E66.9 OBESITY, UNSPECIFIED CLASSIFICATION, UNSPECIFIED OBESITY TYPE, UNSPECIFIED WHETHER SERIOUS COMORBIDITY PRESENT: ICD-10-CM

## 2019-05-10 RX ORDER — TRAMADOL HYDROCHLORIDE 50 MG/1
50-100 TABLET ORAL EVERY 6 HOURS PRN
Qty: 160 TABLET | Refills: 0 | Status: SHIPPED | OUTPATIENT
Start: 2019-05-10 | End: 2019-05-30 | Stop reason: SDUPTHER

## 2019-05-10 RX ORDER — PHENTERMINE HYDROCHLORIDE 37.5 MG/1
37.5 TABLET ORAL EVERY MORNING
Qty: 30 TABLET | Refills: 0 | Status: SHIPPED | OUTPATIENT
Start: 2019-05-10 | End: 2019-07-22 | Stop reason: SDUPTHER

## 2019-05-17 ENCOUNTER — TELEPHONE (OUTPATIENT)
Dept: FAMILY MEDICINE CLINIC | Facility: HOSPITAL | Age: 54
End: 2019-05-17

## 2019-05-17 DIAGNOSIS — G89.29 OTHER CHRONIC PAIN: Primary | ICD-10-CM

## 2019-05-17 RX ORDER — METHOCARBAMOL 750 MG/1
750 TABLET, FILM COATED ORAL 3 TIMES DAILY PRN
Qty: 90 TABLET | Refills: 0 | Status: SHIPPED | OUTPATIENT
Start: 2019-05-17 | End: 2019-05-30 | Stop reason: SDUPTHER

## 2019-05-24 ENCOUNTER — TELEPHONE (OUTPATIENT)
Dept: FAMILY MEDICINE CLINIC | Facility: HOSPITAL | Age: 54
End: 2019-05-24

## 2019-05-24 DIAGNOSIS — F41.9 ANXIETY: ICD-10-CM

## 2019-05-24 RX ORDER — VENLAFAXINE HYDROCHLORIDE 150 MG/1
CAPSULE, EXTENDED RELEASE ORAL
Qty: 30 CAPSULE | Refills: 3 | OUTPATIENT
Start: 2019-05-24

## 2019-05-28 ENCOUNTER — OFFICE VISIT (OUTPATIENT)
Dept: FAMILY MEDICINE CLINIC | Facility: HOSPITAL | Age: 54
End: 2019-05-28

## 2019-05-28 VITALS
DIASTOLIC BLOOD PRESSURE: 80 MMHG | BODY MASS INDEX: 29.35 KG/M2 | HEART RATE: 68 BPM | HEIGHT: 67 IN | SYSTOLIC BLOOD PRESSURE: 120 MMHG | WEIGHT: 187 LBS

## 2019-05-28 DIAGNOSIS — F41.1 GENERALIZED ANXIETY DISORDER: Primary | ICD-10-CM

## 2019-05-28 DIAGNOSIS — F41.9 ANXIETY: ICD-10-CM

## 2019-05-28 DIAGNOSIS — F32.89 OTHER DEPRESSION: ICD-10-CM

## 2019-05-28 PROBLEM — F32.A DEPRESSION: Status: ACTIVE | Noted: 2019-05-28

## 2019-05-28 PROCEDURE — 99213 OFFICE O/P EST LOW 20 MIN: CPT | Performed by: INTERNAL MEDICINE

## 2019-05-28 RX ORDER — VENLAFAXINE HYDROCHLORIDE 150 MG/1
150 CAPSULE, EXTENDED RELEASE ORAL EVERY MORNING
Qty: 30 CAPSULE | Refills: 5 | Status: SHIPPED | OUTPATIENT
Start: 2019-05-28 | End: 2019-05-28

## 2019-05-28 RX ORDER — VENLAFAXINE HYDROCHLORIDE 150 MG/1
150 TABLET, EXTENDED RELEASE ORAL
Qty: 30 TABLET | Refills: 3 | Status: SHIPPED | OUTPATIENT
Start: 2019-05-28 | End: 2019-08-21 | Stop reason: SDUPTHER

## 2019-05-30 DIAGNOSIS — G89.29 OTHER CHRONIC PAIN: ICD-10-CM

## 2019-05-30 RX ORDER — TRAMADOL HYDROCHLORIDE 50 MG/1
50-100 TABLET ORAL EVERY 6 HOURS PRN
Qty: 160 TABLET | Refills: 0 | Status: SHIPPED | OUTPATIENT
Start: 2019-05-30 | End: 2019-06-21 | Stop reason: SDUPTHER

## 2019-05-30 RX ORDER — METHOCARBAMOL 750 MG/1
750 TABLET, FILM COATED ORAL 3 TIMES DAILY PRN
Qty: 90 TABLET | Refills: 0 | Status: SHIPPED | OUTPATIENT
Start: 2019-05-30 | End: 2019-08-21 | Stop reason: SDUPTHER

## 2019-06-21 DIAGNOSIS — G89.29 OTHER CHRONIC PAIN: ICD-10-CM

## 2019-06-21 RX ORDER — TRAMADOL HYDROCHLORIDE 50 MG/1
TABLET ORAL
Qty: 160 TABLET | Refills: 3 | Status: SHIPPED | OUTPATIENT
Start: 2019-06-21 | End: 2019-09-27 | Stop reason: SDUPTHER

## 2019-07-22 DIAGNOSIS — F41.9 ANXIETY: ICD-10-CM

## 2019-07-22 DIAGNOSIS — E66.9 OBESITY, UNSPECIFIED CLASSIFICATION, UNSPECIFIED OBESITY TYPE, UNSPECIFIED WHETHER SERIOUS COMORBIDITY PRESENT: ICD-10-CM

## 2019-07-22 RX ORDER — ALPRAZOLAM 0.5 MG/1
0.5 TABLET ORAL 2 TIMES DAILY PRN
Qty: 60 TABLET | Refills: 0 | Status: SHIPPED | OUTPATIENT
Start: 2019-07-22 | End: 2019-08-30 | Stop reason: SDUPTHER

## 2019-07-22 RX ORDER — PHENTERMINE HYDROCHLORIDE 37.5 MG/1
37.5 TABLET ORAL EVERY MORNING
Qty: 30 TABLET | Refills: 0 | Status: SHIPPED | OUTPATIENT
Start: 2019-07-22 | End: 2019-09-27 | Stop reason: SDUPTHER

## 2019-08-21 DIAGNOSIS — F32.89 OTHER DEPRESSION: ICD-10-CM

## 2019-08-21 DIAGNOSIS — G89.29 OTHER CHRONIC PAIN: ICD-10-CM

## 2019-08-23 RX ORDER — METHOCARBAMOL 750 MG/1
TABLET, FILM COATED ORAL
Qty: 90 TABLET | Refills: 0 | Status: SHIPPED | OUTPATIENT
Start: 2019-08-23 | End: 2019-08-30 | Stop reason: SDUPTHER

## 2019-08-23 RX ORDER — VENLAFAXINE HYDROCHLORIDE 150 MG/1
CAPSULE, EXTENDED RELEASE ORAL
Qty: 30 CAPSULE | Refills: 4 | Status: SHIPPED | OUTPATIENT
Start: 2019-08-23 | End: 2020-02-10

## 2019-08-30 DIAGNOSIS — F41.9 ANXIETY: ICD-10-CM

## 2019-08-30 DIAGNOSIS — G89.29 OTHER CHRONIC PAIN: ICD-10-CM

## 2019-08-30 RX ORDER — ALPRAZOLAM 0.5 MG/1
0.5 TABLET ORAL 2 TIMES DAILY PRN
Qty: 60 TABLET | Refills: 3 | Status: SHIPPED | OUTPATIENT
Start: 2019-08-30 | End: 2019-10-25 | Stop reason: SDUPTHER

## 2019-08-30 RX ORDER — METHOCARBAMOL 750 MG/1
750 TABLET, FILM COATED ORAL 3 TIMES DAILY PRN
Qty: 90 TABLET | Refills: 0 | Status: SHIPPED | OUTPATIENT
Start: 2019-08-30 | End: 2020-07-30 | Stop reason: ALTCHOICE

## 2019-09-23 DIAGNOSIS — G89.29 OTHER CHRONIC PAIN: ICD-10-CM

## 2019-09-23 DIAGNOSIS — E66.9 OBESITY, UNSPECIFIED CLASSIFICATION, UNSPECIFIED OBESITY TYPE, UNSPECIFIED WHETHER SERIOUS COMORBIDITY PRESENT: ICD-10-CM

## 2019-09-23 RX ORDER — PHENTERMINE HYDROCHLORIDE 37.5 MG/1
37.5 TABLET ORAL EVERY MORNING
Qty: 30 TABLET | OUTPATIENT
Start: 2019-09-23

## 2019-09-23 RX ORDER — TRAMADOL HYDROCHLORIDE 50 MG/1
TABLET ORAL
Qty: 160 TABLET | OUTPATIENT
Start: 2019-09-23

## 2019-09-24 DIAGNOSIS — E66.9 OBESITY, UNSPECIFIED CLASSIFICATION, UNSPECIFIED OBESITY TYPE, UNSPECIFIED WHETHER SERIOUS COMORBIDITY PRESENT: ICD-10-CM

## 2019-09-25 RX ORDER — PHENTERMINE HYDROCHLORIDE 37.5 MG/1
37.5 TABLET ORAL EVERY MORNING
Qty: 30 TABLET | OUTPATIENT
Start: 2019-09-25

## 2019-09-27 RX ORDER — TRAMADOL HYDROCHLORIDE 50 MG/1
TABLET ORAL
Qty: 160 TABLET | Refills: 3 | Status: SHIPPED | OUTPATIENT
Start: 2019-09-27 | End: 2020-01-10 | Stop reason: SDUPTHER

## 2019-09-27 RX ORDER — PHENTERMINE HYDROCHLORIDE 37.5 MG/1
37.5 TABLET ORAL EVERY MORNING
Qty: 30 TABLET | Refills: 0 | Status: SHIPPED | OUTPATIENT
Start: 2019-09-27 | End: 2019-10-25 | Stop reason: SDUPTHER

## 2019-10-25 DIAGNOSIS — F41.9 ANXIETY: ICD-10-CM

## 2019-10-25 DIAGNOSIS — E66.9 OBESITY, UNSPECIFIED CLASSIFICATION, UNSPECIFIED OBESITY TYPE, UNSPECIFIED WHETHER SERIOUS COMORBIDITY PRESENT: ICD-10-CM

## 2019-10-25 RX ORDER — PHENTERMINE HYDROCHLORIDE 37.5 MG/1
TABLET ORAL
Qty: 30 TABLET | Refills: 3 | Status: SHIPPED | OUTPATIENT
Start: 2019-10-25 | End: 2019-10-25 | Stop reason: SDUPTHER

## 2019-10-26 RX ORDER — PHENTERMINE HYDROCHLORIDE 37.5 MG/1
37.5 TABLET ORAL DAILY
Qty: 30 TABLET | Refills: 0 | Status: SHIPPED | OUTPATIENT
Start: 2019-10-26 | End: 2020-03-09

## 2019-10-26 RX ORDER — ALPRAZOLAM 0.5 MG/1
0.5 TABLET ORAL 2 TIMES DAILY PRN
Qty: 60 TABLET | Refills: 0 | Status: SHIPPED | OUTPATIENT
Start: 2019-10-26 | End: 2020-01-10 | Stop reason: SDUPTHER

## 2020-01-10 DIAGNOSIS — F41.9 ANXIETY: ICD-10-CM

## 2020-01-10 DIAGNOSIS — G89.29 OTHER CHRONIC PAIN: ICD-10-CM

## 2020-01-10 RX ORDER — ALPRAZOLAM 0.5 MG/1
0.5 TABLET ORAL 2 TIMES DAILY PRN
Qty: 60 TABLET | Refills: 0 | Status: SHIPPED | OUTPATIENT
Start: 2020-01-10 | End: 2020-03-09

## 2020-01-10 RX ORDER — TRAMADOL HYDROCHLORIDE 50 MG/1
TABLET ORAL
Qty: 120 TABLET | Refills: 3 | Status: SHIPPED | OUTPATIENT
Start: 2020-01-10 | End: 2020-04-08

## 2020-02-10 DIAGNOSIS — F32.89 OTHER DEPRESSION: ICD-10-CM

## 2020-02-10 RX ORDER — VENLAFAXINE HYDROCHLORIDE 150 MG/1
CAPSULE, EXTENDED RELEASE ORAL
Qty: 30 CAPSULE | Refills: 4 | Status: SHIPPED | OUTPATIENT
Start: 2020-02-10 | End: 2020-07-01 | Stop reason: SDUPTHER

## 2020-03-09 DIAGNOSIS — F41.9 ANXIETY: ICD-10-CM

## 2020-03-09 DIAGNOSIS — E66.9 OBESITY, UNSPECIFIED CLASSIFICATION, UNSPECIFIED OBESITY TYPE, UNSPECIFIED WHETHER SERIOUS COMORBIDITY PRESENT: ICD-10-CM

## 2020-03-09 RX ORDER — PHENTERMINE HYDROCHLORIDE 37.5 MG/1
37.5 TABLET ORAL DAILY
Qty: 30 TABLET | Refills: 0 | Status: SHIPPED | OUTPATIENT
Start: 2020-03-09 | End: 2020-04-08

## 2020-03-09 RX ORDER — ALPRAZOLAM 0.5 MG/1
0.5 TABLET ORAL 2 TIMES DAILY PRN
Qty: 60 TABLET | Refills: 3 | Status: SHIPPED | OUTPATIENT
Start: 2020-03-09 | End: 2020-07-01 | Stop reason: SDUPTHER

## 2020-04-06 DIAGNOSIS — E66.9 OBESITY, UNSPECIFIED CLASSIFICATION, UNSPECIFIED OBESITY TYPE, UNSPECIFIED WHETHER SERIOUS COMORBIDITY PRESENT: ICD-10-CM

## 2020-04-06 DIAGNOSIS — G89.29 OTHER CHRONIC PAIN: ICD-10-CM

## 2020-04-08 RX ORDER — TRAMADOL HYDROCHLORIDE 50 MG/1
TABLET ORAL
Qty: 120 TABLET | Refills: 3 | Status: SHIPPED | OUTPATIENT
Start: 2020-04-08 | End: 2020-07-30 | Stop reason: SDUPTHER

## 2020-04-08 RX ORDER — PHENTERMINE HYDROCHLORIDE 37.5 MG/1
37.5 TABLET ORAL DAILY
Qty: 30 TABLET | Refills: 0 | Status: SHIPPED | OUTPATIENT
Start: 2020-04-08 | End: 2020-05-04

## 2020-05-04 DIAGNOSIS — E66.9 OBESITY, UNSPECIFIED CLASSIFICATION, UNSPECIFIED OBESITY TYPE, UNSPECIFIED WHETHER SERIOUS COMORBIDITY PRESENT: ICD-10-CM

## 2020-05-04 RX ORDER — PHENTERMINE HYDROCHLORIDE 37.5 MG/1
37.5 TABLET ORAL DAILY
Qty: 30 TABLET | Refills: 0 | Status: SHIPPED | OUTPATIENT
Start: 2020-05-04 | End: 2020-07-30 | Stop reason: ALTCHOICE

## 2020-06-01 DIAGNOSIS — E66.9 OBESITY, UNSPECIFIED CLASSIFICATION, UNSPECIFIED OBESITY TYPE, UNSPECIFIED WHETHER SERIOUS COMORBIDITY PRESENT: ICD-10-CM

## 2020-06-01 RX ORDER — PHENTERMINE HYDROCHLORIDE 37.5 MG/1
37.5 TABLET ORAL DAILY
Qty: 30 TABLET | Refills: 0 | OUTPATIENT
Start: 2020-06-01

## 2020-06-01 NOTE — TELEPHONE ENCOUNTER
Patient needs to be seen at least once a year  She can get a discounted office visit, prior to any refill  Thanks

## 2020-06-03 RX ORDER — PHENTERMINE HYDROCHLORIDE 37.5 MG/1
37.5 TABLET ORAL DAILY
Qty: 30 TABLET | Refills: 0 | OUTPATIENT
Start: 2020-06-03

## 2020-06-29 DIAGNOSIS — F32.89 OTHER DEPRESSION: ICD-10-CM

## 2020-06-29 DIAGNOSIS — F41.9 ANXIETY: ICD-10-CM

## 2020-07-01 ENCOUNTER — TELEPHONE (OUTPATIENT)
Dept: FAMILY MEDICINE CLINIC | Facility: HOSPITAL | Age: 55
End: 2020-07-01

## 2020-07-01 DIAGNOSIS — F32.89 OTHER DEPRESSION: ICD-10-CM

## 2020-07-01 DIAGNOSIS — F41.9 ANXIETY: ICD-10-CM

## 2020-07-01 RX ORDER — ALPRAZOLAM 0.5 MG/1
0.5 TABLET ORAL 2 TIMES DAILY PRN
Qty: 60 TABLET | Refills: 3 | OUTPATIENT
Start: 2020-07-01

## 2020-07-01 RX ORDER — VENLAFAXINE HYDROCHLORIDE 150 MG/1
CAPSULE, EXTENDED RELEASE ORAL
Qty: 30 CAPSULE | Refills: 4 | OUTPATIENT
Start: 2020-07-01

## 2020-07-01 RX ORDER — ALPRAZOLAM 0.5 MG/1
0.5 TABLET ORAL 2 TIMES DAILY PRN
Qty: 60 TABLET | Refills: 0 | Status: SHIPPED | OUTPATIENT
Start: 2020-07-01 | End: 2020-07-30 | Stop reason: SDUPTHER

## 2020-07-01 RX ORDER — VENLAFAXINE HYDROCHLORIDE 150 MG/1
150 CAPSULE, EXTENDED RELEASE ORAL DAILY
Qty: 30 CAPSULE | Refills: 0 | Status: SHIPPED | OUTPATIENT
Start: 2020-07-01 | End: 2020-07-30 | Stop reason: SDUPTHER

## 2020-07-01 NOTE — TELEPHONE ENCOUNTER
Spoke with patient  Send msg to yvonne to see if she can write rx   Pt having a lot of family issues, identity theft she said the only thing keeping her sane is xanx and venalfaxine

## 2020-07-01 NOTE — TELEPHONE ENCOUNTER
Please inform patient only can get 1 month, either needs new provider or to be seen in office, at least once a year

## 2020-07-02 NOTE — TELEPHONE ENCOUNTER
Left msg for patient one month of meds and needs to be seen with us its been over one year or find a provider near her in [place] to get her medication

## 2020-07-27 DIAGNOSIS — F41.9 ANXIETY: ICD-10-CM

## 2020-07-27 DIAGNOSIS — G89.29 OTHER CHRONIC PAIN: ICD-10-CM

## 2020-07-27 DIAGNOSIS — F32.89 OTHER DEPRESSION: ICD-10-CM

## 2020-07-27 RX ORDER — ALPRAZOLAM 0.5 MG/1
TABLET ORAL
Qty: 60 TABLET | Refills: 0 | OUTPATIENT
Start: 2020-07-27

## 2020-07-27 RX ORDER — TRAMADOL HYDROCHLORIDE 50 MG/1
TABLET ORAL
Qty: 120 TABLET | Refills: 3 | OUTPATIENT
Start: 2020-07-27

## 2020-07-27 RX ORDER — VENLAFAXINE HYDROCHLORIDE 150 MG/1
CAPSULE, EXTENDED RELEASE ORAL
Qty: 30 CAPSULE | Refills: 0 | OUTPATIENT
Start: 2020-07-27

## 2020-07-27 NOTE — TELEPHONE ENCOUNTER
Patient has not been seen in over a year, we gave her one month recently to hold her over, but no  More refills

## 2020-07-30 ENCOUNTER — TELEMEDICINE (OUTPATIENT)
Dept: FAMILY MEDICINE CLINIC | Facility: HOSPITAL | Age: 55
End: 2020-07-30

## 2020-07-30 VITALS — BODY MASS INDEX: 28.93 KG/M2 | WEIGHT: 180 LBS | HEIGHT: 66 IN

## 2020-07-30 DIAGNOSIS — F32.89 OTHER DEPRESSION: ICD-10-CM

## 2020-07-30 DIAGNOSIS — F41.1 GENERALIZED ANXIETY DISORDER: ICD-10-CM

## 2020-07-30 DIAGNOSIS — F41.9 ANXIETY: ICD-10-CM

## 2020-07-30 DIAGNOSIS — M25.50 MULTIPLE JOINT PAIN: Primary | ICD-10-CM

## 2020-07-30 DIAGNOSIS — L30.9 DERMATITIS: ICD-10-CM

## 2020-07-30 PROCEDURE — 1036F TOBACCO NON-USER: CPT | Performed by: PHYSICIAN ASSISTANT

## 2020-07-30 PROCEDURE — 99214 OFFICE O/P EST MOD 30 MIN: CPT | Performed by: PHYSICIAN ASSISTANT

## 2020-07-30 PROCEDURE — 3079F DIAST BP 80-89 MM HG: CPT | Performed by: PHYSICIAN ASSISTANT

## 2020-07-30 PROCEDURE — 3074F SYST BP LT 130 MM HG: CPT | Performed by: PHYSICIAN ASSISTANT

## 2020-07-30 PROCEDURE — 3008F BODY MASS INDEX DOCD: CPT | Performed by: PHYSICIAN ASSISTANT

## 2020-07-30 RX ORDER — VENLAFAXINE HYDROCHLORIDE 150 MG/1
150 CAPSULE, EXTENDED RELEASE ORAL DAILY
Qty: 30 CAPSULE | Refills: 5 | Status: SHIPPED | OUTPATIENT
Start: 2020-07-30 | End: 2020-12-14

## 2020-07-30 RX ORDER — ALPRAZOLAM 1 MG/1
1 TABLET ORAL 2 TIMES DAILY PRN
Qty: 60 TABLET | Refills: 3 | Status: SHIPPED | OUTPATIENT
Start: 2020-07-30 | End: 2020-10-21

## 2020-07-30 RX ORDER — ACETAMINOPHEN AND CODEINE PHOSPHATE 300; 30 MG/1; MG/1
1 TABLET ORAL 3 TIMES DAILY
Qty: 90 TABLET | Refills: 2 | Status: SHIPPED | OUTPATIENT
Start: 2020-07-30 | End: 2020-12-14

## 2020-07-30 RX ORDER — VENLAFAXINE HYDROCHLORIDE 37.5 MG/1
37.5 CAPSULE, EXTENDED RELEASE ORAL
Qty: 30 CAPSULE | Refills: 5 | Status: SHIPPED | OUTPATIENT
Start: 2020-07-30 | End: 2020-12-14

## 2020-07-30 RX ORDER — CARBINOXAMINE MALEATE 6 MG/1
6 TABLET ORAL 2 TIMES DAILY PRN
Qty: 120 TABLET | Refills: 3 | Status: SHIPPED | OUTPATIENT
Start: 2020-07-30

## 2020-07-30 NOTE — PROGRESS NOTES
Virtual Regular Visit      Assessment/Plan:    Problem List Items Addressed This Visit        Other    Anxiety    Relevant Medications    ALPRAZolam (XANAX) 1 mg tablet    venlafaxine (EFFEXOR-XR) 37 5 mg 24 hr capsule    Depression    Relevant Medications    ALPRAZolam (XANAX) 1 mg tablet    venlafaxine (EFFEXOR-XR) 37 5 mg 24 hr capsule    venlafaxine (EFFEXOR-XR) 150 mg 24 hr capsule    Generalized anxiety disorder    Relevant Medications    ALPRAZolam (XANAX) 1 mg tablet    venlafaxine (EFFEXOR-XR) 37 5 mg 24 hr capsule    venlafaxine (EFFEXOR-XR) 150 mg 24 hr capsule    Multiple joint pain - Primary    Relevant Medications    acetaminophen-codeine (TYLENOL/CODEINE #3) 300-30 MG per tablet               Reason for visit is   Chief Complaint   Patient presents with    Follow-up    Virtual Regular Visit        Encounter provider Chapin Perez PA-C    Provider located at [place] [address] [place] [address]      Recent Visits  No visits were found meeting these conditions  Showing recent visits within past 7 days and meeting all other requirements     Today's Visits  Date Type Provider Dept   07/30/20 Telemedicine Chapin Perez PA-C [facility]   Showing today's visits and meeting all other requirements     Future Appointments  No visits were found meeting these conditions  Showing future appointments within next 150 days and meeting all other requirements        The patient was identified by name and date of birth  Jatinder Devries was informed that this is a telemedicine visit and that the visit is being conducted through [address] [place] [address] and patient was informed that this is not a secure, HIPAA-complaint platform  She agrees to proceed     My office door was closed  No one else was in the room  She acknowledged consent and understanding of privacy and security of the video platform   The patient has agreed to participate and understands they can discontinue the visit at any time  Patient is aware this is a billable service  Subjective  Jarrod Sanchez is a 48 y o  female   C/o insomnia, even after taking 3-4 xanax  Living in [place] now  Mood alrig, "comes and goes", lives alone, unable to return to [facility]  Past Medical History:   Diagnosis Date    Anxiety     Asthma     Chronic pain     Fractured rib 03/09/2019    Generalized anxiety disorder     Insomnia     Ischemic heart disease     Myalgia     Psychiatric disorder     Skin cancer of scalp     Substance abuse ([facility] [address] )     Willing to be an organ donor        Past Surgical History:   Procedure Laterality Date    ANTERIOR CRUCIATE LIGAMENT REPAIR Bilateral     3 left 2 right    CYST REMOVAL Left     wrist    FOOT NEUROMA SURGERY Left     HAND DEBRIDEMENT Right 11/22/2016    Procedure: irrigation &  DEBRIDEMENT HAND/FINGER right;  Surgeon: Sandy Ling MD;  Location: [facility];  Service:    Leanna López KNEE SURGERY      SHOULDER ARTHROSCOPY W/ ROTATOR CUFF REPAIR Right        Current Outpatient Medications   Medication Sig Dispense Refill    ALPRAZolam (XANAX) 1 mg tablet Take 1 tablet (1 mg total) by mouth 2 (two) times a day as needed for anxiety 60 tablet 3    Black Cohosh-SoyIsoflav-Magnol (ESTROVEN MENOPAUSE RELIEF) CAPS Take 1 capsule by mouth daily      multivitamin (THERAGRAN) TABS Take 1 tablet by mouth daily      venlafaxine (EFFEXOR-XR) 150 mg 24 hr capsule Take 1 capsule (150 mg total) by mouth daily 30 capsule 5    acetaminophen-codeine (TYLENOL/CODEINE #3) 300-30 MG per tablet Take 1 tablet by mouth 3 (three) times a day 90 tablet 2    venlafaxine (EFFEXOR-XR) 37 5 mg 24 hr capsule Take 1 capsule (37 5 mg total) by mouth daily with breakfast 30 capsule 5     No current facility-administered medications for this visit           No Known Allergies    Review of Systems   Constitutional: Positive for diaphoresis and fatigue  Negative for appetite change, chills and fever  HENT: Negative for congestion, ear pain, rhinorrhea and sore throat  Respiratory: Negative for cough, chest tightness and shortness of breath  Gastrointestinal: Negative for abdominal pain, constipation, diarrhea, nausea and vomiting  Musculoskeletal: Positive for arthralgias  Negative for back pain, myalgias, neck pain and neck stiffness  Neurological: Positive for weakness and numbness  Negative for tremors  Does have some hand weakness, occasionally dropping items  Psychiatric/Behavioral: Positive for decreased concentration, dysphoric mood and sleep disturbance  Negative for self-injury and suicidal ideas  The patient is nervous/anxious  Video Exam    Vitals:    07/30/20 1050   Weight: 81 6 kg (180 lb)   Height: 5' 6" (1 676 m)       Physical Exam   Constitutional: She is oriented to person, place, and time  She appears well-developed and well-nourished  No distress  HENT:   Head: Normocephalic and atraumatic  Pulmonary/Chest: Effort normal    Neurological: She is alert and oriented to person, place, and time  No cranial nerve deficit  Skin: She is not diaphoretic  Psychiatric: Her behavior is normal  Judgment and thought content normal    Low mood, irritable, in bed, tired, (trouble sleeping, unable to sleep even after taking multiple xanax)  More anxious and depressed  Nursing note and vitals reviewed  I spent 30 minutes directly with the patient during this visit      VIRTUAL VISIT [address] [place] [address] Dr acknowledges that she has consented to an online visit or consultation  She understands that the online visit is based solely on information provided by her, and that, in the absence of a face-to-face physical evaluation by the physician, the diagnosis she receives is both limited and provisional in terms of accuracy and completeness   This is not intended to replace a full medical face-to-face evaluation by the physician  Alverta Heimlich understands and accepts these terms

## 2020-08-03 DIAGNOSIS — L30.9 DERMATITIS: ICD-10-CM

## 2020-08-03 RX ORDER — CARBINOXAMINE MALEATE 6 MG/1
6 TABLET ORAL 2 TIMES DAILY PRN
Qty: 120 TABLET | Refills: 0 | Status: CANCELLED | OUTPATIENT
Start: 2020-08-03

## 2020-08-05 RX ORDER — CARBINOXAMINE MALEATE 4 MG/1
TABLET ORAL
Qty: 60 EACH | Refills: 1 | Status: SHIPPED | OUTPATIENT
Start: 2020-08-05

## 2020-08-29 ENCOUNTER — NURSE TRIAGE (OUTPATIENT)
Dept: OTHER | Facility: OTHER | Age: 55
End: 2020-08-29

## 2020-08-29 NOTE — TELEPHONE ENCOUNTER
Regarding: Foot injury   ----- Message from Anibal Hernandez sent at 8/29/2020  6:30 PM EDT -----  Pt left a message at 5:48pm stating that she thinks she broke her foot and will be in [place] on Monday  Pt not sure is she needs an appointment or she should go to the ER

## 2020-08-31 ENCOUNTER — APPOINTMENT (EMERGENCY)
Dept: RADIOLOGY | Facility: HOSPITAL | Age: 55
End: 2020-08-31

## 2020-08-31 ENCOUNTER — HOSPITAL ENCOUNTER (EMERGENCY)
Facility: HOSPITAL | Age: 55
Discharge: HOME | End: 2020-08-31
Attending: EMERGENCY MEDICINE | Admitting: EMERGENCY MEDICINE

## 2020-08-31 VITALS
DIASTOLIC BLOOD PRESSURE: 72 MMHG | BODY MASS INDEX: 28.93 KG/M2 | TEMPERATURE: 97.8 F | WEIGHT: 180 LBS | HEART RATE: 76 BPM | RESPIRATION RATE: 19 BRPM | SYSTOLIC BLOOD PRESSURE: 156 MMHG | OXYGEN SATURATION: 99 % | HEIGHT: 66 IN

## 2020-08-31 DIAGNOSIS — S92.301A MULTIPLE CLOSED FRACTURES OF METATARSAL BONE OF RIGHT FOOT, INITIAL ENCOUNTER: Primary | ICD-10-CM

## 2020-08-31 DIAGNOSIS — S97.81XA CRUSHING INJURY OF RIGHT FOOT, INITIAL ENCOUNTER: ICD-10-CM

## 2020-08-31 PROCEDURE — 99284 EMERGENCY DEPT VISIT MOD MDM: CPT | Performed by: EMERGENCY MEDICINE

## 2020-08-31 PROCEDURE — 73630 X-RAY EXAM OF FOOT: CPT

## 2020-08-31 PROCEDURE — 29515 APPLICATION SHORT LEG SPLINT: CPT | Performed by: EMERGENCY MEDICINE

## 2020-08-31 PROCEDURE — 99284 EMERGENCY DEPT VISIT MOD MDM: CPT

## 2020-08-31 RX ORDER — HYDROCODONE BITARTRATE AND ACETAMINOPHEN 5; 325 MG/1; MG/1
1 TABLET ORAL EVERY 6 HOURS PRN
Qty: 16 TABLET | Refills: 0 | Status: SHIPPED | OUTPATIENT
Start: 2020-08-31 | End: 2020-09-09 | Stop reason: SDUPTHER

## 2020-08-31 RX ORDER — HYDROCODONE BITARTRATE AND ACETAMINOPHEN 5; 325 MG/1; MG/1
1 TABLET ORAL ONCE
Status: COMPLETED | OUTPATIENT
Start: 2020-08-31 | End: 2020-08-31

## 2020-08-31 RX ADMIN — HYDROCODONE BITARTRATE AND ACETAMINOPHEN 1 TABLET: 5; 325 TABLET ORAL at 21:02

## 2020-09-01 NOTE — ED PROVIDER NOTES
History  Chief Complaint   Patient presents with    Foot Injury - Major     Pt dropped tailgate on right foot  Patient complains of right foot pain since tailgate of trailer bed fell on her foot as her girlfriend was lowering it yesterday  They immediately removed it  She has pain worse with walking  Took Advil with mild relief  Prior to Admission Medications   Prescriptions Last Dose Informant Patient Reported? Taking?    ALPRAZolam (XANAX) 1 mg tablet   No Yes   Sig: Take 1 tablet (1 mg total) by mouth 2 (two) times a day as needed for anxiety   Black Cohosh-SoyIsoflav-Magnol (ESTROVEN MENOPAUSE RELIEF) CAPS  Self Yes Yes   Sig: Take 1 capsule by mouth daily   Carbinoxamine Maleate 4 MG TABS   No Yes   Sig: Take 1 tablet twice daily prn itching   Carbinoxamine Maleate 6 MG TABS   No Yes   Sig: Take 6 mg by mouth 2 (two) times a day as needed (itching)   acetaminophen-codeine (TYLENOL/CODEINE #3) 300-30 MG per tablet   No Yes   Sig: Take 1 tablet by mouth 3 (three) times a day   multivitamin (THERAGRAN) TABS  Self Yes Yes   Sig: Take 1 tablet by mouth daily   venlafaxine (EFFEXOR-XR) 150 mg 24 hr capsule   No Yes   Sig: Take 1 capsule (150 mg total) by mouth daily   venlafaxine (EFFEXOR-XR) 37 5 mg 24 hr capsule   No Yes   Sig: Take 1 capsule (37 5 mg total) by mouth daily with breakfast      Facility-Administered Medications: None       Past Medical History:   Diagnosis Date    Anxiety     Asthma     Chronic pain     Fractured rib 03/09/2019    Generalized anxiety disorder     Insomnia     Ischemic heart disease     Myalgia     Psychiatric disorder     Skin cancer of scalp     Substance abuse ([facility] [address] )     Willing to be an organ donor        Past Surgical History:   Procedure Laterality Date    ANTERIOR CRUCIATE LIGAMENT REPAIR Bilateral     3 left 2 right    CYST REMOVAL Left     wrist    FOOT NEUROMA SURGERY Left     HAND DEBRIDEMENT Right 11/22/2016    Procedure: irrigation & DEBRIDEMENT HAND/FINGER right;  Surgeon: Albania Davis MD;  [facility]: [facility];  Service:    Zina Rireal KNEE SURGERY      SHOULDER ARTHROSCOPY W/ ROTATOR CUFF REPAIR Right        Family History   Problem Relation Age of Onset    Alzheimer's disease Mother     Atrial fibrillation Mother     Mental illness Mother         alzheimers    Coronary artery disease Father     Hypertension Father     Osteogenesis imperfecta Daughter     Substance Abuse Neg Hx      I have reviewed and agree with the history as documented  E-Cigarette/Vaping    E-Cigarette Use Never User      E-Cigarette/Vaping Substances     Social History     Tobacco Use    Smoking status: Former Smoker    Smokeless tobacco: Never Used    Tobacco comment: quit over 10 years ago   Substance Use Topics    Alcohol use: No     Comment: social drinker per [employer]    Drug use: Not Currently       Review of Systems   All other systems reviewed and are negative  Physical Exam  Physical Exam  Vitals signs and nursing note reviewed  Constitutional:       Appearance: She is well-developed  Eyes:      Conjunctiva/sclera: Conjunctivae normal       Pupils: Pupils are equal, round, and reactive to light  Neck:      Musculoskeletal: Normal range of motion and neck supple  No spinous process tenderness  Cardiovascular:      Rate and Rhythm: Normal rate and regular rhythm  Heart sounds: Normal heart sounds  Pulmonary:      Effort: Pulmonary effort is normal  No respiratory distress  Breath sounds: Normal breath sounds  No wheezing  Abdominal:      General: Bowel sounds are normal  There is no distension  Palpations: Abdomen is soft  Tenderness: There is no abdominal tenderness  Musculoskeletal:      Right foot: Decreased range of motion  Normal capillary refill  Tenderness, bony tenderness, swelling and deformity present  No laceration  Feet:    Skin:     General: Skin is warm and dry  Findings: No rash  Neurological:      Mental Status: She is alert  GCS: GCS eye subscore is 4  GCS verbal subscore is 5  GCS motor subscore is 6  Sensory: No sensory deficit           Vital Signs  ED Triage Vitals [08/31/20 2023]   Temperature Pulse Respirations Blood Pressure SpO2   97 8 °F (36 6 °C) 77 19 156/72 99 %      Temp Source Heart Rate Source Patient Position - Orthostatic VS BP Location FiO2 (%)   Tympanic Monitor Lying Right arm --      Pain Score       Worst Possible Pain           Vitals:    08/31/20 2023 08/31/20 2030   BP: 156/72 156/72   Pulse: 77 76   Patient Position - Orthostatic VS: Lying          Visual Acuity      ED Medications  Medications   HYDROcodone-acetaminophen (NORCO) 5-325 mg per tablet 1 tablet (1 tablet Oral Given 8/31/20 2102)       Diagnostic Studies  Results Reviewed     None                 XR foot 3+ views RIGHT   ED Interpretation by Eloise Moreno DO (08/31 2050)   2nd 3rd 4th midshaft metatarsal fractures                 Procedures  Orthopedic injury treatment    Date/Time: 8/31/2020 9:00 PM  Performed by: Eloise Moreno DO  Authorized by: Eloise Moreno DO     Patient Location:  ED  Other Assisting Provider: No    Verbal consent obtained?: Yes    Risks and benefits: Risks, benefits and alternatives were discussed    Consent given by:  Patient  Patient states understanding of procedure being performed: Yes    Test results available and properly labeled: Yes    Patient identity confirmed:  Verbally with patient  Injury location:  Foot  Location details:  Right foot  Injury type:  Fracture  Fracture type: second metatarsal, third metatarsal and fourth metatarsal    Neurovascular status: Neurovascularly intact    Distal perfusion: normal    Neurological function: normal    Range of motion: reduced    Manipulation performed?: No    Immobilization:  Splint  Splint type:  Short leg  Supplies used:  Ortho-Glass  Neurovascular status: Neurovascularly intact    Distal perfusion: normal Neurological function: normal    Range of motion: unchanged    Patient tolerance:  Patient tolerated the procedure well with no immediate complications             ED Course       US AUDIT      Most Recent Value   Initial Alcohol Screen: US AUDIT-C    1  How often do you have a drink containing alcohol? 1 Filed at: 08/31/2020 2051   2  How many drinks containing alcohol do you have on a typical day you are drinking? 1 Filed at: 08/31/2020 2051   3a  Male UNDER 65: How often do you have five or more drinks on one occasion? 0 Filed at: 08/31/2020 2051   3b  FEMALE Any Age, or MALE 65+: How often do you have 4 or more drinks on one occassion? 1 Filed at: 08/31/2020 2051   Audit-C Score  3 Filed at: 08/31/2020 2051                  VIRGILIO/DAST-10      Most Recent Value   How many times in the past year have you    Used an illegal drug or used a prescription medication for non-medical reasons?   Never Filed at: 08/31/2020 2051                                [facility]  Number of Diagnoses or Management Options  Crushing injury of right foot, initial encounter: new and requires workup  Multiple closed fractures of metatarsal bone of right foot, initial encounter: new and requires workup     Amount and/or Complexity of Data Reviewed  Tests in the radiology section of CPT®: ordered and reviewed    Patient Progress  Patient progress: improved        Disposition  Final diagnoses:   Multiple closed fractures of metatarsal bone of right foot, initial encounter   Crushing injury of right foot, initial encounter     Time reflects when diagnosis was documented in both MDM as applicable and the Disposition within this note     Time User Action Codes Description Comment    8/31/2020  9:08 PM [employer] Add Alfredo Haskins Multiple closed fractures of metatarsal bone of right foot, initial encounter     8/31/2020  9:08 PM [employer] Add [[id number]] Crushing injury of right foot, initial encounter       ED Disposition     ED Disposition Condition Date/Time Comment    Discharge Stable Mon Aug 31, 2020  9:08 PM [facility] discharge to home/self care  Follow-up Information     Follow up With Specialties Details Why Contact Info Additional [address] [facility] Orthopedic Surgery Call in 1 day  [phone] [facility] [phone] [address] [facility], [address] [place] [address], [facility] [address]          Discharge Medication List as of 8/31/2020  9:11 PM      START taking these medications    Details   HYDROcodone-acetaminophen (NORCO) 5-325 mg per tablet Take 1 tablet by mouth every 6 (six) hours as needed for pain for up to 10 daysMax Daily Amount: 4 tablets, Starting Mon 8/31/2020, Until Thu 9/10/2020, Normal         CONTINUE these medications which have NOT CHANGED    Details   acetaminophen-codeine (TYLENOL/CODEINE #3) 300-30 MG per tablet Take 1 tablet by mouth 3 (three) times a day, Starting Thu 7/30/2020, Normal      ALPRAZolam (XANAX) 1 mg tablet Take 1 tablet (1 mg total) by mouth 2 (two) times a day as needed for anxiety, Starting Thu 7/30/2020, Normal      Black Cohosh-SoyIsoflav-Magnol (ESTROVEN MENOPAUSE RELIEF) CAPS Take 1 capsule by mouth daily, Historical Med      !! Carbinoxamine Maleate 4 MG TABS Take 1 tablet twice daily prn itching, Normal      !! Carbinoxamine Maleate 6 MG TABS Take 6 mg by mouth 2 (two) times a day as needed (itching), Starting Thu 7/30/2020, Normal      multivitamin (THERAGRAN) TABS Take 1 tablet by mouth daily, Historical Med      !! venlafaxine (EFFEXOR-XR) 150 mg 24 hr capsule Take 1 capsule (150 mg total) by mouth daily, Starting Thu 7/30/2020, Normal      !! venlafaxine (EFFEXOR-XR) 37 5 mg 24 hr capsule Take 1 capsule (37 5 mg total) by mouth daily with breakfast, Starting Thu 7/30/2020, Normal       !! - Potential duplicate medications found   Please discuss with provider  No discharge procedures on file      PDMP Review       Value Time User    PDMP Reviewed  Yes 7/1/2020  4:43 PM Srinivas Hughes PA-C          ED Provider  Electronically Signed by           Hortensia Corbin DO  08/31/20 4674

## 2020-09-01 NOTE — ED NOTES
Patient refused crutches, states she has a pair in her car        Balwinder Range, ARUNA  08/31/20 2100

## 2020-09-02 ENCOUNTER — VBI (OUTPATIENT)
Dept: FAMILY MEDICINE CLINIC | Facility: HOSPITAL | Age: 55
End: 2020-09-02

## 2020-09-02 NOTE — TELEPHONE ENCOUNTER
Agnes Rojas    ED Visit Information     Ed visit date: 8/31/2020  Diagnosis Description:   Multiple closed fractures of metatarsal bone of right foot, initial encounter; Crushing injury of right foot, initial encounter     In Network? Yes [address]  Discharge status: Home  Discharged with meds ? Yes  Number of ED visits to date: 1  ED Severity:N/a     Outreach Information    Outreach successful: Yes 1  Date letter mailed:N/a  Date Finalized:9/2/2020    Care Coordination    Follow up appointment with pcp: no No ED f/u scheduled  Transportation issues ? No    Value [employer] type:  7 Day Outreach  Emergent necessity warranted by diagnosis:  No   [facility] PCP:  Yes  Transportation:  Self Transport  Called PCP first?:  No  Feels able to call PCP for urgent problems ?:  Yes  Understands what emergencies can be handled by PCP ?:  Yes  Ever any problems getting appointment with PCP for minor emergency/urgency problems?:  No  Reason Patient went to ED instead of Urgent Care or PCP?:  Perceived Severity of Illness  Urgent care Education?:  No  09/02/2020 12:04 PM [facility] ([facility] Alena Najjar (Self) [phone] (H)   Call Complete  Personal communication with patient regarding recent ED visit on 8/31/2020 for Multiple closed fractures of metatarsal bone of right foot, initial encounter; Crushing injury of right foot, initial encounter  Patient was discharged with medication and advise to follow up with orthopedics  Patient stated that she is still in a lot of pain and was not sure what to do regarding follow up because she has no insurance  Patient is currently traveling to visit her children  I advised her to reach out to her PCP for follow up and guidance, patient was agreeable

## 2020-09-08 ENCOUNTER — TELEPHONE (OUTPATIENT)
Dept: FAMILY MEDICINE CLINIC | Facility: HOSPITAL | Age: 55
End: 2020-09-08

## 2020-09-08 NOTE — TELEPHONE ENCOUNTER
Spoke to pt  Was in er on 8/31 and got #16 of Hydrocodone/Acet to take 1 q 6 hr prn pain  Used all of them  Would like a 1 or 2 week supply, leaving for [place] wed 9/9  She will find an ortho doctor down there  thx rachel  Uses [facility]

## 2020-09-09 DIAGNOSIS — S92.301A MULTIPLE CLOSED FRACTURES OF METATARSAL BONE OF RIGHT FOOT, INITIAL ENCOUNTER: ICD-10-CM

## 2020-09-09 RX ORDER — HYDROCODONE BITARTRATE AND ACETAMINOPHEN 5; 325 MG/1; MG/1
1 TABLET ORAL EVERY 6 HOURS PRN
Qty: 30 TABLET | Refills: 0 | Status: SHIPPED | OUTPATIENT
Start: 2020-09-09 | End: 2020-09-19

## 2020-09-21 ENCOUNTER — TELEPHONE (OUTPATIENT)
Dept: FAMILY MEDICINE CLINIC | Facility: HOSPITAL | Age: 55
End: 2020-09-21

## 2020-09-21 DIAGNOSIS — S92.301A MULTIPLE CLOSED FRACTURES OF METATARSAL BONE OF RIGHT FOOT, INITIAL ENCOUNTER: Primary | ICD-10-CM

## 2020-09-24 DIAGNOSIS — M25.50 MULTIPLE JOINT PAIN: ICD-10-CM

## 2020-09-25 RX ORDER — ACETAMINOPHEN AND CODEINE PHOSPHATE 300; 30 MG/1; MG/1
TABLET ORAL
Qty: 90 TABLET | Refills: 2 | OUTPATIENT
Start: 2020-09-25

## 2020-10-20 DIAGNOSIS — F41.9 ANXIETY: ICD-10-CM

## 2020-10-21 RX ORDER — ALPRAZOLAM 1 MG/1
1 TABLET ORAL 2 TIMES DAILY PRN
Qty: 60 TABLET | Refills: 3 | Status: SHIPPED | OUTPATIENT
Start: 2020-10-21 | End: 2021-03-08

## 2020-12-03 ENCOUNTER — TELEPHONE (OUTPATIENT)
Dept: FAMILY MEDICINE CLINIC | Facility: HOSPITAL | Age: 55
End: 2020-12-03

## 2020-12-14 DIAGNOSIS — F41.9 ANXIETY: ICD-10-CM

## 2020-12-14 DIAGNOSIS — F32.89 OTHER DEPRESSION: ICD-10-CM

## 2020-12-14 DIAGNOSIS — M25.50 MULTIPLE JOINT PAIN: ICD-10-CM

## 2020-12-14 RX ORDER — ACETAMINOPHEN AND CODEINE PHOSPHATE 300; 30 MG/1; MG/1
TABLET ORAL
Qty: 90 TABLET | Refills: 2 | Status: SHIPPED | OUTPATIENT
Start: 2020-12-14 | End: 2021-02-11

## 2020-12-14 RX ORDER — VENLAFAXINE HYDROCHLORIDE 150 MG/1
150 CAPSULE, EXTENDED RELEASE ORAL DAILY
Qty: 30 CAPSULE | Refills: 5 | Status: SHIPPED | OUTPATIENT
Start: 2020-12-14

## 2020-12-14 RX ORDER — VENLAFAXINE HYDROCHLORIDE 37.5 MG/1
37.5 CAPSULE, EXTENDED RELEASE ORAL
Qty: 30 CAPSULE | Refills: 5 | Status: SHIPPED | OUTPATIENT
Start: 2020-12-14

## 2021-02-01 ENCOUNTER — TELEPHONE (OUTPATIENT)
Dept: OTHER | Facility: OTHER | Age: 56
End: 2021-02-01

## 2021-02-03 NOTE — TELEPHONE ENCOUNTER
Can you please type up a letter stating patient under our care and due to hx  Of PTSD she requires her service dog    Thanks

## 2021-02-11 DIAGNOSIS — M25.50 MULTIPLE JOINT PAIN: ICD-10-CM

## 2021-02-11 RX ORDER — ACETAMINOPHEN AND CODEINE PHOSPHATE 300; 30 MG/1; MG/1
TABLET ORAL
Qty: 90 TABLET | Refills: 2 | Status: SHIPPED | OUTPATIENT
Start: 2021-02-11

## 2021-03-08 DIAGNOSIS — F41.9 ANXIETY: ICD-10-CM

## 2021-03-08 RX ORDER — ALPRAZOLAM 1 MG/1
1 TABLET ORAL 2 TIMES DAILY PRN
Qty: 60 TABLET | Refills: 3 | Status: SHIPPED | OUTPATIENT
Start: 2021-03-08

## 2021-09-28 ENCOUNTER — TELEPHONE (OUTPATIENT)
Dept: FAMILY MEDICINE CLINIC | Facility: HOSPITAL | Age: 56
End: 2021-09-28

## 2021-09-28 NOTE — TELEPHONE ENCOUNTER
Patient asking if script for xanax can be sent to pharmacy for her  She needs it to sleep and that's the only reason she takes it  Pls send to [facility] and they will mail it to her  Also, mailed a copy of letter from 2/2020 regarding therapy dog, to patient at [address], [address] [place] [address] Street

## 2021-09-29 NOTE — TELEPHONE ENCOUNTER
Patient needs a yearly apt  Has to be seen at least once a year, please inform patient that is the law especially when prescribing controlled substances  Since she is an established patient, we can give her a huge discount

## 2021-11-22 ENCOUNTER — TELEPHONE (OUTPATIENT)
Dept: FAMILY MEDICINE CLINIC | Facility: HOSPITAL | Age: 56
End: 2021-11-22

## 2022-08-26 PROCEDURE — 87070 CULTURE OTHR SPECIMN AEROBIC: CPT | Performed by: OTOLARYNGOLOGY

## 2022-08-26 PROCEDURE — 87205 SMEAR GRAM STAIN: CPT | Performed by: OTOLARYNGOLOGY

## 2022-08-26 PROCEDURE — 87186 SC STD MICRODIL/AGAR DIL: CPT | Performed by: OTOLARYNGOLOGY

## 2022-10-03 ENCOUNTER — APPOINTMENT (OUTPATIENT)
Dept: LAB | Facility: HOSPITAL | Age: 57
End: 2022-10-03
Attending: OTOLARYNGOLOGY
Payer: COMMERCIAL

## 2022-10-03 ENCOUNTER — OFFICE VISIT (OUTPATIENT)
Dept: LAB | Facility: HOSPITAL | Age: 57
End: 2022-10-03
Attending: OTOLARYNGOLOGY

## 2022-10-03 DIAGNOSIS — Z01.818 PRE-OP TESTING: ICD-10-CM

## 2022-10-03 DIAGNOSIS — K12.2 ORAL FISTULA: ICD-10-CM

## 2022-10-03 DIAGNOSIS — J32.0 CHRONIC MAXILLARY SINUSITIS: ICD-10-CM

## 2022-10-03 DIAGNOSIS — T17.0XXA: ICD-10-CM

## 2022-10-03 LAB
ANION GAP SERPL CALCULATED.3IONS-SCNC: 5 MMOL/L (ref 4–13)
BASOPHILS # BLD AUTO: 0.08 THOUSANDS/ΜL (ref 0–0.1)
BASOPHILS NFR BLD AUTO: 1 % (ref 0–1)
BUN SERPL-MCNC: 10 MG/DL (ref 5–25)
CALCIUM SERPL-MCNC: 9 MG/DL (ref 8.3–10.1)
CHLORIDE SERPL-SCNC: 111 MMOL/L (ref 96–108)
CO2 SERPL-SCNC: 23 MMOL/L (ref 21–32)
CREAT SERPL-MCNC: 1.05 MG/DL (ref 0.6–1.3)
EOSINOPHIL # BLD AUTO: 0.26 THOUSAND/ΜL (ref 0–0.61)
EOSINOPHIL NFR BLD AUTO: 3 % (ref 0–6)
ERYTHROCYTE [DISTWIDTH] IN BLOOD BY AUTOMATED COUNT: 14.1 % (ref 11.6–15.1)
GFR SERPL CREATININE-BSD FRML MDRD: 59 ML/MIN/1.73SQ M
GLUCOSE SERPL-MCNC: 84 MG/DL (ref 65–140)
HCT VFR BLD AUTO: 37.2 % (ref 34.8–46.1)
HGB BLD-MCNC: 11.9 G/DL (ref 11.5–15.4)
IMM GRANULOCYTES # BLD AUTO: 0.04 THOUSAND/UL (ref 0–0.2)
IMM GRANULOCYTES NFR BLD AUTO: 1 % (ref 0–2)
LYMPHOCYTES # BLD AUTO: 2.59 THOUSANDS/ΜL (ref 0.6–4.47)
LYMPHOCYTES NFR BLD AUTO: 30 % (ref 14–44)
MCH RBC QN AUTO: 29.9 PG (ref 26.8–34.3)
MCHC RBC AUTO-ENTMCNC: 32 G/DL (ref 31.4–37.4)
MCV RBC AUTO: 94 FL (ref 82–98)
MONOCYTES # BLD AUTO: 0.5 THOUSAND/ΜL (ref 0.17–1.22)
MONOCYTES NFR BLD AUTO: 6 % (ref 4–12)
NEUTROPHILS # BLD AUTO: 5.05 THOUSANDS/ΜL (ref 1.85–7.62)
NEUTS SEG NFR BLD AUTO: 59 % (ref 43–75)
NRBC BLD AUTO-RTO: 0 /100 WBCS
PLATELET # BLD AUTO: 303 THOUSANDS/UL (ref 149–390)
PMV BLD AUTO: 9.4 FL (ref 8.9–12.7)
POTASSIUM SERPL-SCNC: 4.1 MMOL/L (ref 3.5–5.3)
RBC # BLD AUTO: 3.98 MILLION/UL (ref 3.81–5.12)
SODIUM SERPL-SCNC: 139 MMOL/L (ref 135–147)
WBC # BLD AUTO: 8.52 THOUSAND/UL (ref 4.31–10.16)

## 2022-10-03 PROCEDURE — 80048 BASIC METABOLIC PNL TOTAL CA: CPT

## 2022-10-03 PROCEDURE — 36415 COLL VENOUS BLD VENIPUNCTURE: CPT

## 2022-10-03 PROCEDURE — 93005 ELECTROCARDIOGRAM TRACING: CPT

## 2022-10-03 PROCEDURE — 85025 COMPLETE CBC W/AUTO DIFF WBC: CPT

## 2022-10-10 ENCOUNTER — OFFICE VISIT (OUTPATIENT)
Dept: FAMILY MEDICINE CLINIC | Facility: HOSPITAL | Age: 57
End: 2022-10-10
Payer: COMMERCIAL

## 2022-10-10 VITALS
BODY MASS INDEX: 30.37 KG/M2 | HEIGHT: 66 IN | HEART RATE: 80 BPM | SYSTOLIC BLOOD PRESSURE: 138 MMHG | RESPIRATION RATE: 16 BRPM | WEIGHT: 189 LBS | DIASTOLIC BLOOD PRESSURE: 76 MMHG | OXYGEN SATURATION: 96 %

## 2022-10-10 DIAGNOSIS — M15.9 PRIMARY OSTEOARTHRITIS INVOLVING MULTIPLE JOINTS: Primary | ICD-10-CM

## 2022-10-10 DIAGNOSIS — F41.1 GENERALIZED ANXIETY DISORDER: ICD-10-CM

## 2022-10-10 PROBLEM — J45.40 MODERATE PERSISTENT ASTHMA: Status: RESOLVED | Noted: 2017-07-05 | Resolved: 2022-10-10

## 2022-10-10 PROBLEM — R06.02 SOB (SHORTNESS OF BREATH): Status: RESOLVED | Noted: 2017-06-15 | Resolved: 2022-10-10

## 2022-10-10 PROBLEM — M15.0 PRIMARY OSTEOARTHRITIS INVOLVING MULTIPLE JOINTS: Status: ACTIVE | Noted: 2022-10-10

## 2022-10-10 PROBLEM — E66.3 OVERWEIGHT: Status: RESOLVED | Noted: 2017-05-24 | Resolved: 2022-10-10

## 2022-10-10 PROCEDURE — 99204 OFFICE O/P NEW MOD 45 MIN: CPT | Performed by: INTERNAL MEDICINE

## 2022-10-10 RX ORDER — ALPRAZOLAM 0.5 MG/1
0.5 TABLET ORAL
Qty: 30 TABLET | Refills: 0 | Status: SHIPPED | OUTPATIENT
Start: 2022-10-10
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
## 2022-10-14 LAB
ATRIAL RATE: 75 BPM
P AXIS: 14 DEGREES
PR INTERVAL: 160 MS
QRS AXIS: 79 DEGREES
QRSD INTERVAL: 90 MS
QT INTERVAL: 406 MS
QTC INTERVAL: 453 MS
T WAVE AXIS: 17 DEGREES
VENTRICULAR RATE: 75 BPM

## 2022-10-14 PROCEDURE — 93010 ELECTROCARDIOGRAM REPORT: CPT | Performed by: INTERNAL MEDICINE

## 2022-12-28 NOTE — ASSESSMENT & PLAN NOTE
Patient has generalized anxiety disorder  She has a lot of stressors in her life surrounding her previous job and property  She has been seeing an online [address] with psychiatry and counselor  Unfortunately the service is not satisfactory because of turnover of the providers  She has tried to discontinue the online behavioral health service  She is abstinent of alcohol  She used to take alprazolam years ago  She has tried trazodone, Wellbutrin, escitalopram and various herbal remedies for anxiety and her difficulty falling asleep without relief  I advised patient to let me know when I can give her referral to our counselors and psychiatrists because her insomnia will need behavioral health therapy  In the meantime will try to help with anxiety by prescribing alprazolam 0 5 mg daily as needed for anxiety 
Patient presents to establish care  She has osteoarthritis elbows knees  She status post right shoulder surgery and status post meniscal surgery of the knee  She gets increased pain when she walks turns cold  She uses Tylenol and ibuprofen with variable relief  She asked me to prescribe tramadol and she also asked me to prescribe alprazolam for her anxiety  I made her aware of the potential interaction between opioids and benzodiazepines causing respiratory depression and death  We made a decision just help with anxiety with alprazolam and not prescribe tramadol to avoid adverse events such as death 
AFV, FHR, BPP Abnormalities/Hypertensive Disorder/Gestational Diabetes

## 2023-11-06 ENCOUNTER — OFFICE VISIT (OUTPATIENT)
Dept: FAMILY MEDICINE CLINIC | Facility: HOSPITAL | Age: 58
End: 2023-11-06
Payer: COMMERCIAL

## 2023-11-06 VITALS
DIASTOLIC BLOOD PRESSURE: 84 MMHG | HEART RATE: 82 BPM | BODY MASS INDEX: 29.35 KG/M2 | HEIGHT: 66 IN | SYSTOLIC BLOOD PRESSURE: 140 MMHG | WEIGHT: 182.6 LBS | TEMPERATURE: 97.6 F

## 2023-11-06 DIAGNOSIS — F99 INSOMNIA DUE TO OTHER MENTAL DISORDER: ICD-10-CM
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
## 2023-11-06 DIAGNOSIS — G44.209 ACUTE NON INTRACTABLE TENSION-TYPE HEADACHE: ICD-10-CM

## 2023-11-06 DIAGNOSIS — F51.05 INSOMNIA DUE TO OTHER MENTAL DISORDER: ICD-10-CM

## 2023-11-06 DIAGNOSIS — M54.2 NECK PAIN: ICD-10-CM

## 2023-11-06 DIAGNOSIS — F41.1 GENERALIZED ANXIETY DISORDER: ICD-10-CM

## 2023-11-06 PROCEDURE — 99214 OFFICE O/P EST MOD 30 MIN: CPT | Performed by: NURSE PRACTITIONER

## 2023-11-06 RX ORDER — ESCITALOPRAM OXALATE 10 MG/1
10 TABLET ORAL DAILY
Qty: 30 TABLET | Refills: 1 | Status: SHIPPED | OUTPATIENT
Start: 2023-11-06
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
## 2023-11-06 NOTE — ASSESSMENT & PLAN NOTE
Exacerbated by SALLIE - will hope for improvement as anxiety improves w/start of SSRI  Return in 6 weeks for follow up
She requests rx for anxiety as this is becoming more interfering/disruptive on sleep  Lexapro rx issued to take every evening  Advise she should return in 6 weeks for next follow up
ADMIT

## 2023-12-01 ENCOUNTER — TELEPHONE (OUTPATIENT)
Dept: FAMILY MEDICINE CLINIC | Facility: HOSPITAL | Age: 58
End: 2023-12-01

## 2023-12-01 DIAGNOSIS — F99 INSOMNIA DUE TO OTHER MENTAL DISORDER: Primary | ICD-10-CM

## 2023-12-01 DIAGNOSIS — F51.05 INSOMNIA DUE TO OTHER MENTAL DISORDER: Primary | ICD-10-CM

## 2023-12-01 RX ORDER — TRAZODONE HYDROCHLORIDE 50 MG/1
50 TABLET ORAL
Qty: 30 TABLET | Refills: 0 | Status: SHIPPED | OUTPATIENT
Start: 2023-12-01

## 2023-12-01 NOTE — TELEPHONE ENCOUNTER
I forgot to include in the original message, the patient is currently out of state and is not returning to [place] for a few weeks.

## 2023-12-01 NOTE — TELEPHONE ENCOUNTER
I scheduled pt w/ you next week for a follow-up. She is currently in [address] - updated pharmacy added in.
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
## 2023-12-01 NOTE — TELEPHONE ENCOUNTER
Patient calling to ask if her medication can be changed. States that she when she saw Avelinonaima Morse she was started on Escitalopram. Patient states that she wanted something to help her sleep and this medication does nothing for her. She also states that in the past she was taking Xanax PRN and Trazadone PRN to help her sleep. She is asking if she can go back on these two medications instead of the Escitalopram. Can Zoe Morse address this as she is the one that prescribed it?

## 2023-12-25 DIAGNOSIS — F51.05 INSOMNIA DUE TO OTHER MENTAL DISORDER: ICD-10-CM

## 2023-12-25 DIAGNOSIS — F99 INSOMNIA DUE TO OTHER MENTAL DISORDER: ICD-10-CM

## 2023-12-25 RX ORDER — TRAZODONE HYDROCHLORIDE 50 MG/1
50 TABLET ORAL
Qty: 30 TABLET | Refills: 0 | Status: SHIPPED | OUTPATIENT
Start: 2023-12-25

## 2024-01-05 ENCOUNTER — TELEPHONE (OUTPATIENT)
Dept: FAMILY MEDICINE CLINIC | Facility: HOSPITAL | Age: 59
End: 2024-01-05

## 2024-01-05 DIAGNOSIS — F41.1 GENERALIZED ANXIETY DISORDER: ICD-10-CM

## 2024-01-05 RX ORDER — ESCITALOPRAM OXALATE 10 MG/1
10 TABLET ORAL DAILY
Qty: 30 TABLET | Refills: 0 | Status: SHIPPED | OUTPATIENT
Start: 2024-01-05

## 2024-01-05 NOTE — TELEPHONE ENCOUNTER
Hi, this is Marysol Veras, birth date [birth year]. I need to reschedule my Monday appointment. There's a snow storm and I can't come up from [place] for it. I was wondering if we could do a virtual appointment. If that works, [phone], give me a call [phone]. Thank you.        I spoke to Lily told her we could not do a virtual if she was I another state - it was for a 4 wk follow up - she said she is suffering from severe depression and anxiety  she was crying - I said I would pass this on and see if there was anything we could do

## 2024-01-05 NOTE — TELEPHONE ENCOUNTER
Monday's appointment will need to be rescheduled for in person. Please confirm she is taking the lexapro I prescribed, and if so she can increase to 20mg daily (take 2 of the 10mg tablets). I think it would be hager for her to have an easily accessible PCP in [place] where she now resides, otherwise she would need to be seen in the ED in case she would need crisis intervention.

## 2024-01-05 NOTE — TELEPHONE ENCOUNTER
Refill for lexapro 10mg issued as requested. I do not typically follow her and manage her mental health so I will not prescribe the xanax, archana without seeing her. Advise she reschedule appt within the month and would be best to see her usual PCP.

## 2024-01-05 NOTE — TELEPHONE ENCOUNTER
Pt aware, pt canceled for Monday, did not want to schedule right away but states when she calls back to reschedule it will be with PCP.

## 2024-01-28 DIAGNOSIS — F99 INSOMNIA DUE TO OTHER MENTAL DISORDER: ICD-10-CM

## 2024-01-28 DIAGNOSIS — F51.05 INSOMNIA DUE TO OTHER MENTAL DISORDER: ICD-10-CM

## 2024-01-29 RX ORDER — TRAZODONE HYDROCHLORIDE 50 MG/1
50 TABLET ORAL
Qty: 30 TABLET | Refills: 0 | Status: SHIPPED | OUTPATIENT
Start: 2024-01-29

## 2024-01-31 DIAGNOSIS — F41.1 GENERALIZED ANXIETY DISORDER: ICD-10-CM

## 2024-01-31 RX ORDER — ESCITALOPRAM OXALATE 10 MG/1
10 TABLET ORAL DAILY
Qty: 30 TABLET | Refills: 0 | Status: SHIPPED | OUTPATIENT
Start: 2024-01-31

## 2024-02-25 DIAGNOSIS — F99 INSOMNIA DUE TO OTHER MENTAL DISORDER: ICD-10-CM

## 2024-02-25 DIAGNOSIS — F51.05 INSOMNIA DUE TO OTHER MENTAL DISORDER: ICD-10-CM

## 2024-02-25 RX ORDER — TRAZODONE HYDROCHLORIDE 50 MG/1
50 TABLET ORAL
Qty: 30 TABLET | Refills: 0 | Status: SHIPPED | OUTPATIENT
Start: 2024-02-25

## 2024-03-05 DIAGNOSIS — F41.1 GENERALIZED ANXIETY DISORDER: ICD-10-CM

## 2024-03-05 RX ORDER — ESCITALOPRAM OXALATE 10 MG/1
10 TABLET ORAL DAILY
Qty: 30 TABLET | Refills: 0 | Status: SHIPPED | OUTPATIENT
Start: 2024-03-05

## 2024-03-23 DIAGNOSIS — F51.05 INSOMNIA DUE TO OTHER MENTAL DISORDER: ICD-10-CM

## 2024-03-23 DIAGNOSIS — F99 INSOMNIA DUE TO OTHER MENTAL DISORDER: ICD-10-CM

## 2024-03-23 RX ORDER — TRAZODONE HYDROCHLORIDE 50 MG/1
50 TABLET ORAL
Qty: 30 TABLET | Refills: 0 | Status: SHIPPED | OUTPATIENT
Start: 2024-03-23

## 2024-04-10 DIAGNOSIS — F41.1 GENERALIZED ANXIETY DISORDER: ICD-10-CM

## 2024-04-10 RX ORDER — ESCITALOPRAM OXALATE 10 MG/1
10 TABLET ORAL DAILY
Qty: 30 TABLET | Refills: 0 | Status: SHIPPED | OUTPATIENT
Start: 2024-04-10

## 2024-04-22 DIAGNOSIS — F99 INSOMNIA DUE TO OTHER MENTAL DISORDER: ICD-10-CM

## 2024-04-22 DIAGNOSIS — F51.05 INSOMNIA DUE TO OTHER MENTAL DISORDER: ICD-10-CM

## 2024-04-22 RX ORDER — TRAZODONE HYDROCHLORIDE 50 MG/1
50 TABLET ORAL
Qty: 30 TABLET | Refills: 0 | Status: SHIPPED | OUTPATIENT
Start: 2024-04-22

## 2024-05-15 DIAGNOSIS — F41.1 GENERALIZED ANXIETY DISORDER: ICD-10-CM

## 2024-05-15 RX ORDER — ESCITALOPRAM OXALATE 10 MG/1
10 TABLET ORAL DAILY
Qty: 30 TABLET | Refills: 0 | Status: SHIPPED | OUTPATIENT
Start: 2024-05-15

## 2024-05-16 NOTE — TELEPHONE ENCOUNTER
Patient has been scheduled for 6/24/24.    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

## 2024-06-06 DIAGNOSIS — F99 INSOMNIA DUE TO OTHER MENTAL DISORDER: ICD-10-CM

## 2024-06-06 DIAGNOSIS — F51.05 INSOMNIA DUE TO OTHER MENTAL DISORDER: ICD-10-CM

## 2024-06-06 RX ORDER — TRAZODONE HYDROCHLORIDE 50 MG/1
50 TABLET ORAL
Qty: 30 TABLET | Refills: 0 | Status: SHIPPED | OUTPATIENT
Start: 2024-06-06

## 2024-06-09 DIAGNOSIS — F41.1 GENERALIZED ANXIETY DISORDER: ICD-10-CM

## 2024-06-09 RX ORDER — ESCITALOPRAM OXALATE 10 MG/1
10 TABLET ORAL DAILY
Qty: 30 TABLET | Refills: 0 | Status: SHIPPED | OUTPATIENT
Start: 2024-06-09 | End: 2024-06-20 | Stop reason: SDUPTHER

## 2024-06-19 NOTE — PROGRESS NOTES
[facility]   Lorena REVELES    Assessment/Plan:   1. Mild episode of recurrent major depressive disorder (HCC)  Assessment & Plan:  PHQ-2/9 Depression Screening    Little interest or pleasure in doing things: 0 - not at all  Feeling down, depressed, or hopeless: 0 - not at all  Trouble falling or staying asleep, or sleeping too much: 3 - nearly every day  Feeling tired or having little energy: 3 - nearly every day  Poor appetite or overeating: 3 - nearly every day  Feeling bad about yourself - or that you are a failure or have let yourself or your family down: 0 - not at all  Trouble concentrating on things, such as reading the newspaper or watching television: 0 - not at all  Moving or speaking so slowly that other people could have noticed. Or the opposite - being so fidgety or restless that you have been moving around a lot more than usual: 0 - not at all  Thoughts that you would be better off dead, or of hurting yourself in some way: 0 - not at all  PHQ-9 Score: 9  PHQ-9 Interpretation: Mild depression       -adherent to lexapro 10mg and trazodone.  Unclear is she is receiving CBT at this time.  Did ask for updated labwork which can be discussed at her follow up appointment.     2. Generalized anxiety disorder  Assessment & Plan:  Significant anxiety and situational stress regarding life events during the past 10 years; speech is rapid and tangential, difficult to follow at times..  Very consumed by lawsuits and legal troubles;  reports this effects her sleep.  Takes trazodone but this does not help her fall asleep.  Requesting benzodiazapine's.  Discussed given her history of alcohol abuse and SI as well as living out of state most of the year I am reluctant to prescribe this medication.  Discussed meditative therapies, magnesium supplementation which she is agreeable to.  Has strong jasper which she feels is a great support to her along with her family.   -continue lexapro 10mg and  trazodone 50mg.  Consider CBT if not currently in therapy; was unable to obtain this information today.    Orders:  -     escitalopram (LEXAPRO) 10 mg tablet; Take 1 tablet (10 mg total) by mouth daily  -     Comprehensive metabolic panel; Future  3. Insomnia due to other mental disorder  Assessment & Plan:  Reports trazodone effective with sleep but she has trouble getting to sleep still.  Requested benzodiazapine's as she has been on this in the past.  Discussed given her history of alcohol abuse and SI as well as living out of state most of the year I am reluctant to prescribe this medication.  Discussed meditative therapies, magnesium supplementation which she is agreeable to.    Orders:  -     traZODone (DESYREL) 50 mg tablet; Take 1 tablet (50 mg total) by mouth daily at bedtime as needed for sleep  4. Screening for thyroid disorder  -     TSH, 3rd generation with Free T4 reflex; Future  5. Screening for deficiency anemia  -     CBC and differential; Future  6. Screening for lipid disorders  -     Lipid panel; Future  7. Vitamin D deficiency  -     Vitamin D 25 hydroxy; Future  8. Need for hepatitis C screening test  -     Hepatitis C Antibody; Future  9. Screening for HIV (human immunodeficiency virus)  -     HIV 1/2 AG/AB w Reflex [facility] for 2 yr old and above; Future  10. Encounter for screening mammogram for malignant neoplasm of breast  -     Mammo screening bilateral w 3d & cad; Future  11. Primary hypertension  -     lisinopril (ZESTRIL) 2.5 mg tablet; Take 1 tablet (2.5 mg total) by mouth daily  12. Screening for colon cancer  13. History of colon polyps  -     Ambulatory Referral to Gastroenterology; Future      Ordered labwork  Magnesium glycinate or magnesium L threonate for sleep  Lisinopril 2.5mg daily  Schedule mammogram, colonoscopy  Return in 6 weeks (on 8/1/2024) for Annual physical, F/U after bloodwork, Return for PAP.  Patient may call or return to office with any questions or concerns.      ______________________________________________________________________  Subjective:     Patient ID: Lily Veras is a 59 y.o. female.  Lily Veras  No chief complaint on file.    Here for follow up.  Lives in [place] in the winter but comes here to see her children/grandchildren in warmer weather.  Significant anxiety and situational stress regarding life events during the past 10 years; speech is rapid and tangential, difficult to follow at times..  Very consumed by lawsuits and legal troubles;  reports this effects her sleep.  Takes trazodone but this does not help her fall asleep.  Requesting benzodiazapine's.  Discussed given her history of alcohol abuse and SI as well as living out of state most of the year I am reluctant to prescribe this medication.  Discussed meditative therapies, magnesium supplementation which she is agreeable to.  Has strong jasper which she feels is a great support to her along with her family.                   Depression Screening and Follow-up Plan: Patient's depression screening was positive with a PHQ-9 score of 9. Patient assessed for underlying major depression. Brief counseling provided and recommend additional follow-up/re-evaluation next office visit. Patient with underlying depression and was advised to continue current medications as prescribed.         The following portions of the patient's history were reviewed and updated as appropriate: allergies, current medications, past family history, past medical history, past social history, past surgical history, and problem list.    Review of Systems   Constitutional: Negative.  Negative for activity change, appetite change, chills, fatigue and fever.   HENT: Negative.  Negative for congestion, ear pain, postnasal drip and sinus pain.    Eyes: Negative.    Respiratory: Negative.  Negative for cough and shortness of breath.    Cardiovascular: Negative.  Negative for chest pain and leg swelling.   Gastrointestinal:  Negative.  Negative for constipation and diarrhea.   Endocrine: Negative.    Genitourinary: Negative.  Negative for dysuria.   Musculoskeletal: Negative.    Skin: Negative.    Allergic/Immunologic: Negative.  Negative for immunocompromised state.   Neurological: Negative.  Negative for dizziness and light-headedness.   Hematological: Negative.    Psychiatric/Behavioral:  Positive for sleep disturbance. The patient is nervous/anxious.          Objective:      Vitals:    06/20/24 1402   BP: 142/70   Pulse: 77   Resp: 16   SpO2: 97%      Physical Exam  Vitals and nursing note reviewed.   Constitutional:       Appearance: Normal appearance.   HENT:      Head: Normocephalic and atraumatic.      Right Ear: Tympanic membrane, ear canal and external ear normal.      Left Ear: Tympanic membrane, ear canal and external ear normal.      Nose: Nose normal.      Mouth/Throat:      Mouth: Mucous membranes are moist.      Pharynx: Oropharynx is clear.   Eyes:      Extraocular Movements: Extraocular movements intact.      Conjunctiva/sclera: Conjunctivae normal.      Pupils: Pupils are equal, round, and reactive to light.   Cardiovascular:      Rate and Rhythm: Normal rate and regular rhythm.      Pulses: Normal pulses.      Heart sounds: Normal heart sounds.   Pulmonary:      Effort: Pulmonary effort is normal.      Breath sounds: Normal breath sounds.   Abdominal:      General: Bowel sounds are normal.      Palpations: Abdomen is soft.   Musculoskeletal:         General: Normal range of motion.      Cervical back: Normal range of motion and neck supple.   Skin:     General: Skin is warm and dry.   Neurological:      General: No focal deficit present.      Mental Status: She is alert and oriented to person, place, and time.   Psychiatric:         Mood and Affect: Mood is anxious.         Speech: Speech is rapid and pressured and tangential.         Behavior: Behavior is hyperactive.         Thought Content: Thought content normal.  "        Judgment: Judgment normal.           Portions of the record may have been created with voice recognition software. Occasional wrong word or \"sound alike\" substitutions may have occurred due to the inherent limitations of voice recognition software. Please review the chart carefully and recognize, using context, where substitutions/typographical errors may have occurred.       "

## 2024-06-20 ENCOUNTER — OFFICE VISIT (OUTPATIENT)
Dept: FAMILY MEDICINE CLINIC | Facility: HOSPITAL | Age: 59
End: 2024-06-20
Payer: COMMERCIAL

## 2024-06-20 VITALS
SYSTOLIC BLOOD PRESSURE: 142 MMHG | OXYGEN SATURATION: 97 % | DIASTOLIC BLOOD PRESSURE: 70 MMHG | HEIGHT: 65 IN | RESPIRATION RATE: 16 BRPM | BODY MASS INDEX: 30.49 KG/M2 | HEART RATE: 77 BPM | WEIGHT: 183 LBS

## 2024-06-20 DIAGNOSIS — Z11.59 NEED FOR HEPATITIS C SCREENING TEST: ICD-10-CM

## 2024-06-20 DIAGNOSIS — I10 PRIMARY HYPERTENSION: ICD-10-CM

## 2024-06-20 DIAGNOSIS — E55.9 VITAMIN D DEFICIENCY: ICD-10-CM

## 2024-06-20 DIAGNOSIS — Z13.0 SCREENING FOR DEFICIENCY ANEMIA: ICD-10-CM

## 2024-06-20 DIAGNOSIS — F99 INSOMNIA DUE TO OTHER MENTAL DISORDER: ICD-10-CM

## 2024-06-20 DIAGNOSIS — Z11.4 SCREENING FOR HIV (HUMAN IMMUNODEFICIENCY VIRUS): ICD-10-CM

## 2024-06-20 DIAGNOSIS — Z12.31 ENCOUNTER FOR SCREENING MAMMOGRAM FOR MALIGNANT NEOPLASM OF BREAST: ICD-10-CM

## 2024-06-20 DIAGNOSIS — Z13.29 SCREENING FOR THYROID DISORDER: ICD-10-CM

## 2024-06-20 DIAGNOSIS — Z86.010 HISTORY OF COLON POLYPS: ICD-10-CM

## 2024-06-20 DIAGNOSIS — F33.0 MILD EPISODE OF RECURRENT MAJOR DEPRESSIVE DISORDER (HCC): Primary | ICD-10-CM

## 2024-06-20 DIAGNOSIS — F51.05 INSOMNIA DUE TO OTHER MENTAL DISORDER: ICD-10-CM

## 2024-06-20 DIAGNOSIS — Z13.220 SCREENING FOR LIPID DISORDERS: ICD-10-CM

## 2024-06-20 DIAGNOSIS — F41.1 GENERALIZED ANXIETY DISORDER: ICD-10-CM

## 2024-06-20 DIAGNOSIS — Z12.11 SCREENING FOR COLON CANCER: ICD-10-CM

## 2024-06-20 PROCEDURE — 99214 OFFICE O/P EST MOD 30 MIN: CPT | Performed by: NURSE PRACTITIONER

## 2024-06-20 RX ORDER — LISINOPRIL 2.5 MG/1
2.5 TABLET ORAL DAILY
Qty: 30 TABLET | Refills: 5 | Status: SHIPPED | OUTPATIENT
Start: 2024-06-20

## 2024-06-20 RX ORDER — VALACYCLOVIR HYDROCHLORIDE 500 MG/1
500 TABLET, FILM COATED ORAL 3 TIMES DAILY
COMMUNITY
Start: 2024-05-15

## 2024-06-20 RX ORDER — ESCITALOPRAM OXALATE 10 MG/1
10 TABLET ORAL DAILY
Qty: 90 TABLET | Refills: 3 | Status: SHIPPED | OUTPATIENT
Start: 2024-06-20

## 2024-06-20 RX ORDER — TRAZODONE HYDROCHLORIDE 50 MG/1
50 TABLET ORAL
Qty: 90 TABLET | Refills: 0 | Status: SHIPPED | OUTPATIENT
Start: 2024-06-20

## 2024-06-20 NOTE — ASSESSMENT & PLAN NOTE
PHQ-2/9 Depression Screening    Little interest or pleasure in doing things: 0 - not at all  Feeling down, depressed, or hopeless: 0 - not at all  Trouble falling or staying asleep, or sleeping too much: 3 - nearly every day  Feeling tired or having little energy: 3 - nearly every day  Poor appetite or overeating: 3 - nearly every day  Feeling bad about yourself - or that you are a failure or have let yourself or your family down: 0 - not at all  Trouble concentrating on things, such as reading the newspaper or watching television: 0 - not at all  Moving or speaking so slowly that other people could have noticed. Or the opposite - being so fidgety or restless that you have been moving around a lot more than usual: 0 - not at all  Thoughts that you would be better off dead, or of hurting yourself in some way: 0 - not at all  PHQ-9 Score: 9  PHQ-9 Interpretation: Mild depression       -adherent to lexapro 10mg and trazodone.  Unclear is she is receiving CBT at this time.  Did ask for updated labwork which can be discussed at her follow up appointment.

## 2024-06-20 NOTE — ASSESSMENT & PLAN NOTE
Significant anxiety and situational stress regarding life events during the past 10 years; speech is rapid and tangential, difficult to follow at times..  Very consumed by lawsuits and legal troubles;  reports this effects her sleep.  Takes trazodone but this does not help her fall asleep.  Requesting benzodiazapine's.  Discussed given her history of alcohol abuse and SI as well as living out of state most of the year I am reluctant to prescribe this medication.  Discussed meditative therapies, magnesium supplementation which she is agreeable to.  Has strong jasper which she feels is a great support to her along with her family.   -continue lexapro 10mg and trazodone 50mg.  Consider CBT if not currently in therapy; was unable to obtain this information today.

## 2024-06-20 NOTE — ASSESSMENT & PLAN NOTE
Reports trazodone effective with sleep but she has trouble getting to sleep still.  Requested benzodiazapine's as she has been on this in the past.  Discussed given her history of alcohol abuse and SI as well as living out of state most of the year I am reluctant to prescribe this medication.  Discussed meditative therapies, magnesium supplementation which she is agreeable to.

## 2024-06-20 NOTE — PATIENT INSTRUCTIONS
Ordered labwork. No eating/caffeine 8 hours prior.  Drink plenty of water  Magnesium glycinate or magnesium L threonate for sleep  Lisinopril 2.5mg daily  Schedule mammogram, colonoscopy

## 2024-06-20 NOTE — ASSESSMENT & PLAN NOTE
Bp have been historically elevated.  SALLIE likely associated.  Denies HA/Cp/dyspnea.    Start lisinopril 2.5mg daily  Check labwork.

## 2024-06-22 NOTE — TELEPHONE ENCOUNTER
Called PT and left her know that we will fill for the weekend  PAST SURGICAL HISTORY:  Cataract     S/P breast lumpectomy     S/P hip replacement, left     S/P hip replacement, right     S/P hysterectomy

## 2024-09-16 ENCOUNTER — TELEPHONE (OUTPATIENT)
Age: 59
End: 2024-09-16

## 2024-09-16 NOTE — TELEPHONE ENCOUNTER
Patient had seen Dr. Parish in the past but can no longer see him because his office does not take her [place] insurance. Patient stated she has two holes into her sinus cavity post dental surgery with constant drainage and voice changes. I offered first available in June 2025 but she declined and instead wished to be placed on the wait list. I also provided the phone number for [facility] in [place].

## 2024-09-20 DIAGNOSIS — F99 INSOMNIA DUE TO OTHER MENTAL DISORDER: ICD-10-CM

## 2024-09-20 DIAGNOSIS — F51.05 INSOMNIA DUE TO OTHER MENTAL DISORDER: ICD-10-CM

## 2024-09-20 RX ORDER — TRAZODONE HYDROCHLORIDE 50 MG/1
50 TABLET, FILM COATED ORAL
Qty: 90 TABLET | Refills: 1 | Status: SHIPPED | OUTPATIENT
Start: 2024-09-20

## 2024-11-12 ENCOUNTER — OFFICE VISIT (OUTPATIENT)
Dept: FAMILY MEDICINE CLINIC | Facility: HOSPITAL | Age: 59
End: 2024-11-12

## 2024-11-12 VITALS
BODY MASS INDEX: 29.19 KG/M2 | TEMPERATURE: 98.3 F | HEIGHT: 65 IN | OXYGEN SATURATION: 96 % | WEIGHT: 175.2 LBS | HEART RATE: 86 BPM | DIASTOLIC BLOOD PRESSURE: 80 MMHG | SYSTOLIC BLOOD PRESSURE: 146 MMHG

## 2024-11-12 DIAGNOSIS — I10 PRIMARY HYPERTENSION: Primary | ICD-10-CM

## 2024-11-12 DIAGNOSIS — M54.31 SCIATICA OF RIGHT SIDE: ICD-10-CM

## 2024-11-12 DIAGNOSIS — B00.1 RECURRENT COLD SORES: ICD-10-CM

## 2024-11-12 PROCEDURE — 99214 OFFICE O/P EST MOD 30 MIN: CPT

## 2024-11-12 RX ORDER — VALACYCLOVIR HYDROCHLORIDE 500 MG/1
500 TABLET, FILM COATED ORAL DAILY
Qty: 30 TABLET | Refills: 2 | Status: SHIPPED | OUTPATIENT
Start: 2024-11-12 | End: 2025-02-10

## 2024-11-12 RX ORDER — METHOCARBAMOL 500 MG/1
500 TABLET, FILM COATED ORAL 3 TIMES DAILY PRN
Qty: 30 TABLET | Refills: 0 | Status: SHIPPED | OUTPATIENT
Start: 2024-11-12

## 2024-11-12 NOTE — LETTER
November 12, 2024     Patient: Lily Veras  YOB: 1965  Date of Visit: 11/12/2024      To Whom it May Concern:    Lily Veras is under my professional care. Lily is prescribed methocarbamol for sciatica pain. It is used as needed for pain during long drives.    If you have any questions or concerns, please don't hesitate to call.         Sincerely,          Tori Thornton,         CC: No Recipients

## 2024-11-12 NOTE — ASSESSMENT & PLAN NOTE
- Not well controlled  - Current regimen: lisinopril 2.5mg QD  - Reports compliance, denies side effects    Plan:  - I recommend pt increase to 5mg QD. Pt declines, reports associated stressors and would like to continue with 2.5mg as she believes it is situational  - Will maintain patient on current regimen  - Lab work previously ordered, reminded pt to obtain  - pharmacy confirmed, - F/u when possible for annual physical

## 2024-11-12 NOTE — PROGRESS NOTES
Ambulatory Visit  Name: Lily Veras      : 1965      MRN: [id number]  Encounter Provider: Tori Thornton DO  Encounter Date: 2024   Encounter department: [facility] [address]    Assessment & Plan  Primary hypertension    - Not well controlled  - Current regimen: lisinopril 2.5mg QD  - Reports compliance, denies side effects    Plan:  - I recommend pt increase to 5mg QD. Pt declines, reports associated stressors and would like to continue with 2.5mg as she believes it is situational  - Will maintain patient on current regimen  - Lab work previously ordered, reminded pt to obtain  - pharmacy confirmed, - F/u when possible for annual physical           Recurrent cold sores  - Will prescribe a refill of valtrex and maintenance dose for cold sores, pharmacy confirmed    Orders:    valACYclovir (VALTREX) 500 mg tablet; Take 1 tablet (500 mg total) by mouth daily    Sciatica of right side  - Requests prescription for PRN Robaxin for sciatica pain  - pharmacy confirmed  - Pt denies sedative effects, of note  Orders:    methocarbamol (ROBAXIN) 500 mg tablet; Take 1 tablet (500 mg total) by mouth 3 (three) times a day as needed for muscle spasms       History of Present Illness     59-year-old female presents for the following:    HTN  - On lisinopril  - Has been taking lisinopril 2.5mg QD  - Goes to [facility] for BP readings - 185/90, but other times 150/75  - Reports associated stress of home having mold infestation as well as ongoing court cases    Cold sores  - Would like refill of valtrex for cold sores  -States she was previously on Valtrex for an eye problem, and ever since stopping she has felt cold sores reoccurring  -Reports associated stressors    Would like a prescription for Robaxin  - Does a lot of driving, which aggravates sciatica  - Patient requests prescription  -Patient moved to [place], and frequently has to drive back and forth which aggravates  "her lower back        History obtained from : patient  Review of Systems   Respiratory:  Negative for shortness of breath.    Cardiovascular:  Negative for chest pain.   Musculoskeletal:  Positive for back pain.     Current Outpatient Medications on File Prior to Visit   Medication Sig Dispense Refill    escitalopram (LEXAPRO) 10 mg tablet Take 1 tablet (10 mg total) by mouth daily 90 tablet 3    lisinopril (ZESTRIL) 2.5 mg tablet Take 1 tablet (2.5 mg total) by mouth daily 30 tablet 5    multivitamin (THERAGRAN) TABS Take 1 tablet by mouth daily      traZODone (DESYREL) 50 mg tablet TAKE 1 TABLET BY MOUTH ONCE DAILY AT BEDTIME AS NEEDED FOR SLEEP 90 tablet 1    [DISCONTINUED] valACYclovir (VALTREX) 500 mg tablet Take 500 mg by mouth 3 (three) times a day       No current facility-administered medications on file prior to visit.      Social History     Tobacco Use    Smoking status: Some Days    Smokeless tobacco: Never    Tobacco comments:     quit over 10 years ago   Vaping Use    Vaping status: Never Used   Substance and Sexual Activity    Alcohol use: No     Comment: social drinker per [employer]    Drug use: Not Currently    Sexual activity: Not on file         Objective     /80   Pulse 86   Temp 98.3 °F (36.8 °C) (Tympanic)   Ht 5' 5\" (1.651 m)   Wt 79.5 kg (175 lb 3.2 oz)   SpO2 96%   BMI 29.15 kg/m²     Physical Exam  Constitutional:       General: She is not in acute distress.     Appearance: Normal appearance. She is not ill-appearing.   HENT:      Head: Normocephalic and atraumatic.   Cardiovascular:      Rate and Rhythm: Normal rate and regular rhythm.      Heart sounds: Normal heart sounds.   Pulmonary:      Effort: Pulmonary effort is normal.      Breath sounds: Normal breath sounds.   Neurological:      Mental Status: She is alert.      Gait: Gait normal.   Psychiatric:         Mood and Affect: Mood normal.         Behavior: Behavior normal.         Tori Thornton, DO  Family " Medicine

## 2024-11-12 NOTE — ASSESSMENT & PLAN NOTE
- Requests prescription for PRN Robaxin for sciatica pain  - pharmacy confirmed  - Pt denies sedative effects, of note  Orders:    methocarbamol (ROBAXIN) 500 mg tablet; Take 1 tablet (500 mg total) by mouth 3 (three) times a day as needed for muscle spasms

## 2024-11-12 NOTE — ASSESSMENT & PLAN NOTE
- Will prescribe a refill of valtrex and maintenance dose for cold sores, pharmacy confirmed    Orders:    valACYclovir (VALTREX) 500 mg tablet; Take 1 tablet (500 mg total) by mouth daily

## 2025-04-28 DIAGNOSIS — B00.1 RECURRENT COLD SORES: ICD-10-CM

## 2025-04-28 DIAGNOSIS — F41.1 GENERALIZED ANXIETY DISORDER: ICD-10-CM

## 2025-04-28 DIAGNOSIS — F51.05 INSOMNIA DUE TO OTHER MENTAL DISORDER: ICD-10-CM

## 2025-04-28 DIAGNOSIS — F99 INSOMNIA DUE TO OTHER MENTAL DISORDER: ICD-10-CM

## 2025-04-28 DIAGNOSIS — M54.31 SCIATICA OF RIGHT SIDE: ICD-10-CM

## 2025-04-28 RX ORDER — TRAZODONE HYDROCHLORIDE 50 MG/1
50 TABLET ORAL
Qty: 90 TABLET | Refills: 1 | Status: SHIPPED | OUTPATIENT
Start: 2025-04-28

## 2025-04-28 RX ORDER — ESCITALOPRAM OXALATE 10 MG/1
10 TABLET ORAL DAILY
Qty: 90 TABLET | Refills: 3 | Status: SHIPPED | OUTPATIENT
Start: 2025-04-28

## 2025-04-28 RX ORDER — DIPHENOXYLATE HYDROCHLORIDE AND ATROPINE SULFATE 2.5; .025 MG/1; MG/1
1 TABLET ORAL DAILY
Qty: 90 TABLET | Refills: 3 | Status: SHIPPED | OUTPATIENT
Start: 2025-04-28

## 2025-04-28 RX ORDER — METHOCARBAMOL 500 MG/1
500 TABLET, FILM COATED ORAL 3 TIMES DAILY PRN
Qty: 30 TABLET | Refills: 0 | Status: SHIPPED | OUTPATIENT
Start: 2025-04-28

## 2025-04-28 RX ORDER — VALACYCLOVIR HYDROCHLORIDE 500 MG/1
500 TABLET, FILM COATED ORAL DAILY
Qty: 30 TABLET | Refills: 2 | Status: SHIPPED | OUTPATIENT
Start: 2025-04-28 | End: 2025-07-27

## 2025-04-28 NOTE — TELEPHONE ENCOUNTER
Patient is calling to request a refill on all the pended medications, confirmed [place] pharmacy on file    Patient also scheduled an appointment to discuss other depression medications she was given while incarcerated and would like to continue on them but has no paperwork so she wants to discuss with PCP